# Patient Record
Sex: FEMALE | Race: WHITE | NOT HISPANIC OR LATINO | URBAN - METROPOLITAN AREA
[De-identification: names, ages, dates, MRNs, and addresses within clinical notes are randomized per-mention and may not be internally consistent; named-entity substitution may affect disease eponyms.]

---

## 2017-02-09 ENCOUNTER — OFFICE VISIT - HEALTHEAST (OUTPATIENT)
Dept: ADDICTION MEDICINE | Facility: CLINIC | Age: 52
End: 2017-02-09

## 2017-02-09 DIAGNOSIS — F10.20 SEVERE ALCOHOL USE DISORDER (H): ICD-10-CM

## 2017-03-07 ENCOUNTER — OFFICE VISIT - HEALTHEAST (OUTPATIENT)
Dept: ADDICTION MEDICINE | Facility: CLINIC | Age: 52
End: 2017-03-07

## 2017-03-07 DIAGNOSIS — F10.20 SEVERE ALCOHOL USE DISORDER (H): ICD-10-CM

## 2017-03-08 ENCOUNTER — OFFICE VISIT - HEALTHEAST (OUTPATIENT)
Dept: ADDICTION MEDICINE | Facility: CLINIC | Age: 52
End: 2017-03-08

## 2017-03-08 DIAGNOSIS — F10.20 SEVERE ALCOHOL USE DISORDER (H): ICD-10-CM

## 2017-03-10 ENCOUNTER — AMBULATORY - HEALTHEAST (OUTPATIENT)
Dept: ADDICTION MEDICINE | Facility: CLINIC | Age: 52
End: 2017-03-10

## 2017-03-10 ENCOUNTER — OFFICE VISIT - HEALTHEAST (OUTPATIENT)
Dept: ADDICTION MEDICINE | Facility: CLINIC | Age: 52
End: 2017-03-10

## 2017-03-10 DIAGNOSIS — F10.20 SEVERE ALCOHOL USE DISORDER (H): ICD-10-CM

## 2017-03-11 ENCOUNTER — AMBULATORY - HEALTHEAST (OUTPATIENT)
Dept: ADDICTION MEDICINE | Facility: CLINIC | Age: 52
End: 2017-03-11

## 2017-03-13 ENCOUNTER — OFFICE VISIT - HEALTHEAST (OUTPATIENT)
Dept: ADDICTION MEDICINE | Facility: CLINIC | Age: 52
End: 2017-03-13

## 2017-03-13 DIAGNOSIS — F10.20 SEVERE ALCOHOL USE DISORDER (H): ICD-10-CM

## 2017-03-17 ENCOUNTER — AMBULATORY - HEALTHEAST (OUTPATIENT)
Dept: ADDICTION MEDICINE | Facility: CLINIC | Age: 52
End: 2017-03-17

## 2017-03-20 ENCOUNTER — COMMUNICATION - HEALTHEAST (OUTPATIENT)
Dept: ADDICTION MEDICINE | Facility: CLINIC | Age: 52
End: 2017-03-20

## 2017-03-21 ENCOUNTER — OFFICE VISIT - HEALTHEAST (OUTPATIENT)
Dept: ADDICTION MEDICINE | Facility: CLINIC | Age: 52
End: 2017-03-21

## 2017-03-21 DIAGNOSIS — F10.20 SEVERE ALCOHOL USE DISORDER (H): ICD-10-CM

## 2017-03-22 ENCOUNTER — COMMUNICATION - HEALTHEAST (OUTPATIENT)
Dept: ADDICTION MEDICINE | Facility: CLINIC | Age: 52
End: 2017-03-22

## 2017-03-23 ENCOUNTER — AMBULATORY - HEALTHEAST (OUTPATIENT)
Dept: ADDICTION MEDICINE | Facility: CLINIC | Age: 52
End: 2017-03-23

## 2017-03-24 ENCOUNTER — OFFICE VISIT - HEALTHEAST (OUTPATIENT)
Dept: ADDICTION MEDICINE | Facility: CLINIC | Age: 52
End: 2017-03-24

## 2017-03-24 DIAGNOSIS — F10.20 SEVERE ALCOHOL USE DISORDER (H): ICD-10-CM

## 2017-03-27 ENCOUNTER — OFFICE VISIT - HEALTHEAST (OUTPATIENT)
Dept: ADDICTION MEDICINE | Facility: CLINIC | Age: 52
End: 2017-03-27

## 2017-03-27 DIAGNOSIS — F10.20 SEVERE ALCOHOL USE DISORDER (H): ICD-10-CM

## 2017-03-30 ENCOUNTER — AMBULATORY - HEALTHEAST (OUTPATIENT)
Dept: ADDICTION MEDICINE | Facility: CLINIC | Age: 52
End: 2017-03-30

## 2017-03-31 ENCOUNTER — OFFICE VISIT - HEALTHEAST (OUTPATIENT)
Dept: ADDICTION MEDICINE | Facility: CLINIC | Age: 52
End: 2017-03-31

## 2017-03-31 DIAGNOSIS — F10.20 SEVERE ALCOHOL USE DISORDER (H): ICD-10-CM

## 2017-04-03 ENCOUNTER — OFFICE VISIT - HEALTHEAST (OUTPATIENT)
Dept: ADDICTION MEDICINE | Facility: CLINIC | Age: 52
End: 2017-04-03

## 2017-04-03 DIAGNOSIS — F10.20 SEVERE ALCOHOL USE DISORDER (H): ICD-10-CM

## 2017-04-04 ENCOUNTER — OFFICE VISIT - HEALTHEAST (OUTPATIENT)
Dept: ADDICTION MEDICINE | Facility: CLINIC | Age: 52
End: 2017-04-04

## 2017-04-04 DIAGNOSIS — F10.20 SEVERE ALCOHOL USE DISORDER (H): ICD-10-CM

## 2017-04-05 ENCOUNTER — OFFICE VISIT - HEALTHEAST (OUTPATIENT)
Dept: ADDICTION MEDICINE | Facility: CLINIC | Age: 52
End: 2017-04-05

## 2017-04-05 DIAGNOSIS — F10.20 SEVERE ALCOHOL USE DISORDER (H): ICD-10-CM

## 2017-04-07 ENCOUNTER — OFFICE VISIT - HEALTHEAST (OUTPATIENT)
Dept: ADDICTION MEDICINE | Facility: CLINIC | Age: 52
End: 2017-04-07

## 2017-04-07 DIAGNOSIS — F10.20 SEVERE ALCOHOL USE DISORDER (H): ICD-10-CM

## 2017-04-09 ENCOUNTER — AMBULATORY - HEALTHEAST (OUTPATIENT)
Dept: ADDICTION MEDICINE | Facility: CLINIC | Age: 52
End: 2017-04-09

## 2017-04-12 ENCOUNTER — COMMUNICATION - HEALTHEAST (OUTPATIENT)
Dept: ADDICTION MEDICINE | Facility: CLINIC | Age: 52
End: 2017-04-12

## 2017-04-13 ENCOUNTER — AMBULATORY - HEALTHEAST (OUTPATIENT)
Dept: ADDICTION MEDICINE | Facility: CLINIC | Age: 52
End: 2017-04-13

## 2017-04-17 ENCOUNTER — OFFICE VISIT - HEALTHEAST (OUTPATIENT)
Dept: ADDICTION MEDICINE | Facility: CLINIC | Age: 52
End: 2017-04-17

## 2017-04-17 DIAGNOSIS — F10.20 SEVERE ALCOHOL USE DISORDER (H): ICD-10-CM

## 2017-04-18 ENCOUNTER — OFFICE VISIT - HEALTHEAST (OUTPATIENT)
Dept: ADDICTION MEDICINE | Facility: CLINIC | Age: 52
End: 2017-04-18

## 2017-04-18 DIAGNOSIS — F10.20 SEVERE ALCOHOL USE DISORDER (H): ICD-10-CM

## 2017-04-19 ENCOUNTER — COMMUNICATION - HEALTHEAST (OUTPATIENT)
Dept: ADDICTION MEDICINE | Facility: CLINIC | Age: 52
End: 2017-04-19

## 2017-04-19 ENCOUNTER — OFFICE VISIT - HEALTHEAST (OUTPATIENT)
Dept: ADDICTION MEDICINE | Facility: CLINIC | Age: 52
End: 2017-04-19

## 2017-04-19 DIAGNOSIS — F10.20 SEVERE ALCOHOL USE DISORDER (H): ICD-10-CM

## 2017-04-24 ENCOUNTER — OFFICE VISIT - HEALTHEAST (OUTPATIENT)
Dept: ADDICTION MEDICINE | Facility: CLINIC | Age: 52
End: 2017-04-24

## 2017-04-24 ENCOUNTER — AMBULATORY - HEALTHEAST (OUTPATIENT)
Dept: ADDICTION MEDICINE | Facility: CLINIC | Age: 52
End: 2017-04-24

## 2017-04-24 DIAGNOSIS — F10.20 SEVERE ALCOHOL USE DISORDER (H): ICD-10-CM

## 2017-04-25 ENCOUNTER — OFFICE VISIT - HEALTHEAST (OUTPATIENT)
Dept: ADDICTION MEDICINE | Facility: CLINIC | Age: 52
End: 2017-04-25

## 2017-04-25 DIAGNOSIS — F10.20 SEVERE ALCOHOL USE DISORDER (H): ICD-10-CM

## 2017-04-26 ENCOUNTER — OFFICE VISIT - HEALTHEAST (OUTPATIENT)
Dept: ADDICTION MEDICINE | Facility: CLINIC | Age: 52
End: 2017-04-26

## 2017-04-26 DIAGNOSIS — F10.20 SEVERE ALCOHOL USE DISORDER (H): ICD-10-CM

## 2017-04-28 ENCOUNTER — OFFICE VISIT - HEALTHEAST (OUTPATIENT)
Dept: ADDICTION MEDICINE | Facility: CLINIC | Age: 52
End: 2017-04-28

## 2017-04-28 DIAGNOSIS — F10.20 SEVERE ALCOHOL USE DISORDER (H): ICD-10-CM

## 2017-04-29 ENCOUNTER — AMBULATORY - HEALTHEAST (OUTPATIENT)
Dept: ADDICTION MEDICINE | Facility: CLINIC | Age: 52
End: 2017-04-29

## 2017-05-01 ENCOUNTER — OFFICE VISIT - HEALTHEAST (OUTPATIENT)
Dept: ADDICTION MEDICINE | Facility: CLINIC | Age: 52
End: 2017-05-01

## 2017-05-01 DIAGNOSIS — F10.20 SEVERE ALCOHOL USE DISORDER (H): ICD-10-CM

## 2017-05-02 ENCOUNTER — OFFICE VISIT - HEALTHEAST (OUTPATIENT)
Dept: ADDICTION MEDICINE | Facility: CLINIC | Age: 52
End: 2017-05-02

## 2017-05-02 DIAGNOSIS — F10.20 SEVERE ALCOHOL USE DISORDER (H): ICD-10-CM

## 2017-05-05 ENCOUNTER — COMMUNICATION - HEALTHEAST (OUTPATIENT)
Dept: ADDICTION MEDICINE | Facility: CLINIC | Age: 52
End: 2017-05-05

## 2017-05-09 ENCOUNTER — AMBULATORY - HEALTHEAST (OUTPATIENT)
Dept: ADDICTION MEDICINE | Facility: CLINIC | Age: 52
End: 2017-05-09

## 2017-05-12 ENCOUNTER — AMBULATORY - HEALTHEAST (OUTPATIENT)
Dept: ADDICTION MEDICINE | Facility: CLINIC | Age: 52
End: 2017-05-12

## 2017-05-15 ENCOUNTER — OFFICE VISIT - HEALTHEAST (OUTPATIENT)
Dept: ADDICTION MEDICINE | Facility: CLINIC | Age: 52
End: 2017-05-15

## 2017-05-15 DIAGNOSIS — F10.20 SEVERE ALCOHOL USE DISORDER (H): ICD-10-CM

## 2017-05-16 ENCOUNTER — OFFICE VISIT - HEALTHEAST (OUTPATIENT)
Dept: ADDICTION MEDICINE | Facility: CLINIC | Age: 52
End: 2017-05-16

## 2017-05-16 ENCOUNTER — COMMUNICATION - HEALTHEAST (OUTPATIENT)
Dept: ADDICTION MEDICINE | Facility: CLINIC | Age: 52
End: 2017-05-16

## 2017-05-16 DIAGNOSIS — F10.20 SEVERE ALCOHOL USE DISORDER (H): ICD-10-CM

## 2017-05-19 ENCOUNTER — OFFICE VISIT - HEALTHEAST (OUTPATIENT)
Dept: ADDICTION MEDICINE | Facility: CLINIC | Age: 52
End: 2017-05-19

## 2017-05-19 ENCOUNTER — AMBULATORY - HEALTHEAST (OUTPATIENT)
Dept: ADDICTION MEDICINE | Facility: CLINIC | Age: 52
End: 2017-05-19

## 2017-05-19 DIAGNOSIS — F10.20 SEVERE ALCOHOL USE DISORDER (H): ICD-10-CM

## 2017-05-23 ENCOUNTER — OFFICE VISIT - HEALTHEAST (OUTPATIENT)
Dept: ADDICTION MEDICINE | Facility: CLINIC | Age: 52
End: 2017-05-23

## 2017-05-23 DIAGNOSIS — F10.20 SEVERE ALCOHOL USE DISORDER (H): ICD-10-CM

## 2017-05-26 ENCOUNTER — OFFICE VISIT - HEALTHEAST (OUTPATIENT)
Dept: ADDICTION MEDICINE | Facility: CLINIC | Age: 52
End: 2017-05-26

## 2017-05-26 ENCOUNTER — AMBULATORY - HEALTHEAST (OUTPATIENT)
Dept: ADDICTION MEDICINE | Facility: CLINIC | Age: 52
End: 2017-05-26

## 2017-05-26 DIAGNOSIS — F10.20 SEVERE ALCOHOL USE DISORDER (H): ICD-10-CM

## 2017-06-02 ENCOUNTER — AMBULATORY - HEALTHEAST (OUTPATIENT)
Dept: ADDICTION MEDICINE | Facility: CLINIC | Age: 52
End: 2017-06-02

## 2017-06-07 ENCOUNTER — COMMUNICATION - HEALTHEAST (OUTPATIENT)
Dept: ADDICTION MEDICINE | Facility: CLINIC | Age: 52
End: 2017-06-07

## 2017-06-09 ENCOUNTER — AMBULATORY - HEALTHEAST (OUTPATIENT)
Dept: ADDICTION MEDICINE | Facility: CLINIC | Age: 52
End: 2017-06-09

## 2017-06-12 ENCOUNTER — OFFICE VISIT - HEALTHEAST (OUTPATIENT)
Dept: ADDICTION MEDICINE | Facility: CLINIC | Age: 52
End: 2017-06-12

## 2017-06-12 DIAGNOSIS — F10.20 SEVERE ALCOHOL USE DISORDER (H): ICD-10-CM

## 2017-06-15 ENCOUNTER — AMBULATORY - HEALTHEAST (OUTPATIENT)
Dept: ADDICTION MEDICINE | Facility: CLINIC | Age: 52
End: 2017-06-15

## 2017-06-16 ENCOUNTER — OFFICE VISIT - HEALTHEAST (OUTPATIENT)
Dept: ADDICTION MEDICINE | Facility: CLINIC | Age: 52
End: 2017-06-16

## 2017-06-16 DIAGNOSIS — F10.20 SEVERE ALCOHOL USE DISORDER (H): ICD-10-CM

## 2017-06-23 ENCOUNTER — AMBULATORY - HEALTHEAST (OUTPATIENT)
Dept: ADDICTION MEDICINE | Facility: CLINIC | Age: 52
End: 2017-06-23

## 2017-06-27 ENCOUNTER — OFFICE VISIT - HEALTHEAST (OUTPATIENT)
Dept: ADDICTION MEDICINE | Facility: CLINIC | Age: 52
End: 2017-06-27

## 2017-06-27 DIAGNOSIS — F10.20 SEVERE ALCOHOL USE DISORDER (H): ICD-10-CM

## 2017-06-29 ENCOUNTER — OFFICE VISIT - HEALTHEAST (OUTPATIENT)
Dept: ADDICTION MEDICINE | Facility: CLINIC | Age: 52
End: 2017-06-29

## 2017-06-29 DIAGNOSIS — F10.20 SEVERE ALCOHOL USE DISORDER (H): ICD-10-CM

## 2017-06-30 ENCOUNTER — AMBULATORY - HEALTHEAST (OUTPATIENT)
Dept: ADDICTION MEDICINE | Facility: CLINIC | Age: 52
End: 2017-06-30

## 2017-07-07 ENCOUNTER — AMBULATORY - HEALTHEAST (OUTPATIENT)
Dept: ADDICTION MEDICINE | Facility: CLINIC | Age: 52
End: 2017-07-07

## 2017-07-14 ENCOUNTER — COMMUNICATION - HEALTHEAST (OUTPATIENT)
Dept: ADDICTION MEDICINE | Facility: CLINIC | Age: 52
End: 2017-07-14

## 2017-07-14 ENCOUNTER — AMBULATORY - HEALTHEAST (OUTPATIENT)
Dept: ADDICTION MEDICINE | Facility: CLINIC | Age: 52
End: 2017-07-14

## 2017-07-22 ENCOUNTER — AMBULATORY - HEALTHEAST (OUTPATIENT)
Dept: ADDICTION MEDICINE | Facility: CLINIC | Age: 52
End: 2017-07-22

## 2017-07-26 ENCOUNTER — OFFICE VISIT - HEALTHEAST (OUTPATIENT)
Dept: ADDICTION MEDICINE | Facility: CLINIC | Age: 52
End: 2017-07-26

## 2017-07-26 DIAGNOSIS — F10.20 ALCOHOL USE DISORDER, SEVERE, DEPENDENCE (H): ICD-10-CM

## 2017-07-27 ENCOUNTER — COMMUNICATION - HEALTHEAST (OUTPATIENT)
Dept: ADDICTION MEDICINE | Facility: CLINIC | Age: 52
End: 2017-07-27

## 2017-08-09 ENCOUNTER — OFFICE VISIT - HEALTHEAST (OUTPATIENT)
Dept: ADDICTION MEDICINE | Facility: CLINIC | Age: 52
End: 2017-08-09

## 2017-08-09 DIAGNOSIS — F10.20 ALCOHOL USE DISORDER, SEVERE, DEPENDENCE (H): ICD-10-CM

## 2017-08-11 ENCOUNTER — OFFICE VISIT - HEALTHEAST (OUTPATIENT)
Dept: ADDICTION MEDICINE | Facility: CLINIC | Age: 52
End: 2017-08-11

## 2017-08-11 DIAGNOSIS — F10.20 ALCOHOL USE DISORDER, SEVERE, DEPENDENCE (H): ICD-10-CM

## 2017-08-17 ENCOUNTER — COMMUNICATION - HEALTHEAST (OUTPATIENT)
Dept: ADDICTION MEDICINE | Facility: CLINIC | Age: 52
End: 2017-08-17

## 2017-08-18 ENCOUNTER — OFFICE VISIT - HEALTHEAST (OUTPATIENT)
Dept: ADDICTION MEDICINE | Facility: CLINIC | Age: 52
End: 2017-08-18

## 2017-08-18 DIAGNOSIS — F10.20 ALCOHOL USE DISORDER, SEVERE, DEPENDENCE (H): ICD-10-CM

## 2017-08-20 ENCOUNTER — AMBULATORY - HEALTHEAST (OUTPATIENT)
Dept: ADDICTION MEDICINE | Facility: CLINIC | Age: 52
End: 2017-08-20

## 2017-08-24 ENCOUNTER — AMBULATORY - HEALTHEAST (OUTPATIENT)
Dept: ADDICTION MEDICINE | Facility: CLINIC | Age: 52
End: 2017-08-24

## 2017-08-28 ENCOUNTER — OFFICE VISIT - HEALTHEAST (OUTPATIENT)
Dept: ADDICTION MEDICINE | Facility: CLINIC | Age: 52
End: 2017-08-28

## 2017-08-28 DIAGNOSIS — F10.20 ALCOHOL USE DISORDER, SEVERE, DEPENDENCE (H): ICD-10-CM

## 2017-09-04 ENCOUNTER — AMBULATORY - HEALTHEAST (OUTPATIENT)
Dept: ADDICTION MEDICINE | Facility: CLINIC | Age: 52
End: 2017-09-04

## 2017-09-05 ENCOUNTER — COMMUNICATION - HEALTHEAST (OUTPATIENT)
Dept: ADDICTION MEDICINE | Facility: CLINIC | Age: 52
End: 2017-09-05

## 2017-09-05 ENCOUNTER — OFFICE VISIT - HEALTHEAST (OUTPATIENT)
Dept: ADDICTION MEDICINE | Facility: CLINIC | Age: 52
End: 2017-09-05

## 2017-09-05 DIAGNOSIS — F10.20 ALCOHOL USE DISORDER, SEVERE, DEPENDENCE (H): ICD-10-CM

## 2017-09-08 ENCOUNTER — OFFICE VISIT - HEALTHEAST (OUTPATIENT)
Dept: ADDICTION MEDICINE | Facility: CLINIC | Age: 52
End: 2017-09-08

## 2017-09-08 DIAGNOSIS — F10.20 ALCOHOL USE DISORDER, SEVERE, DEPENDENCE (H): ICD-10-CM

## 2017-09-11 ENCOUNTER — AMBULATORY - HEALTHEAST (OUTPATIENT)
Dept: ADDICTION MEDICINE | Facility: CLINIC | Age: 52
End: 2017-09-11

## 2017-09-12 ENCOUNTER — OFFICE VISIT - HEALTHEAST (OUTPATIENT)
Dept: ADDICTION MEDICINE | Facility: CLINIC | Age: 52
End: 2017-09-12

## 2017-09-12 DIAGNOSIS — F10.20 ALCOHOL USE DISORDER, SEVERE, DEPENDENCE (H): ICD-10-CM

## 2017-09-15 ENCOUNTER — OFFICE VISIT - HEALTHEAST (OUTPATIENT)
Dept: ADDICTION MEDICINE | Facility: CLINIC | Age: 52
End: 2017-09-15

## 2017-09-15 DIAGNOSIS — F10.20 SEVERE ALCOHOL USE DISORDER (H): ICD-10-CM

## 2017-09-17 ENCOUNTER — AMBULATORY - HEALTHEAST (OUTPATIENT)
Dept: ADDICTION MEDICINE | Facility: CLINIC | Age: 52
End: 2017-09-17

## 2017-09-18 ENCOUNTER — COMMUNICATION - HEALTHEAST (OUTPATIENT)
Dept: ADMINISTRATIVE | Facility: CLINIC | Age: 52
End: 2017-09-18

## 2017-09-19 ENCOUNTER — OFFICE VISIT - HEALTHEAST (OUTPATIENT)
Dept: FAMILY MEDICINE | Facility: CLINIC | Age: 52
End: 2017-09-19

## 2017-09-19 DIAGNOSIS — M54.2 CHRONIC NECK AND BACK PAIN: ICD-10-CM

## 2017-09-19 DIAGNOSIS — Z12.31 VISIT FOR SCREENING MAMMOGRAM: ICD-10-CM

## 2017-09-19 DIAGNOSIS — Z98.890 HISTORY OF BREAST SURGERY: ICD-10-CM

## 2017-09-19 DIAGNOSIS — G47.00 INSOMNIA: ICD-10-CM

## 2017-09-19 DIAGNOSIS — M54.41 CHRONIC RIGHT-SIDED LOW BACK PAIN WITH RIGHT-SIDED SCIATICA: ICD-10-CM

## 2017-09-19 DIAGNOSIS — Z01.818 PRE-OP EVALUATION: ICD-10-CM

## 2017-09-19 DIAGNOSIS — G89.29 CHRONIC RIGHT-SIDED LOW BACK PAIN WITH RIGHT-SIDED SCIATICA: ICD-10-CM

## 2017-09-19 DIAGNOSIS — Z12.11 SCREEN FOR COLON CANCER: ICD-10-CM

## 2017-09-19 DIAGNOSIS — L98.7 EXCESS SKIN OF ABDOMEN: ICD-10-CM

## 2017-09-19 DIAGNOSIS — M54.9 CHRONIC NECK AND BACK PAIN: ICD-10-CM

## 2017-09-19 DIAGNOSIS — G89.29 CHRONIC NECK AND BACK PAIN: ICD-10-CM

## 2017-09-19 DIAGNOSIS — B00.9 HSV-1 (HERPES SIMPLEX VIRUS 1) INFECTION: ICD-10-CM

## 2017-09-19 DIAGNOSIS — D51.0 PERNICIOUS ANEMIA: ICD-10-CM

## 2017-09-19 RX ORDER — MAGNESIUM 200 MG
TABLET ORAL DAILY
Status: SHIPPED | COMMUNITY
Start: 2017-09-19

## 2017-09-19 ASSESSMENT — MIFFLIN-ST. JEOR: SCORE: 1228.64

## 2017-09-20 ENCOUNTER — OFFICE VISIT - HEALTHEAST (OUTPATIENT)
Dept: ADDICTION MEDICINE | Facility: CLINIC | Age: 52
End: 2017-09-20

## 2017-09-20 ENCOUNTER — COMMUNICATION - HEALTHEAST (OUTPATIENT)
Dept: FAMILY MEDICINE | Facility: CLINIC | Age: 52
End: 2017-09-20

## 2017-09-20 DIAGNOSIS — Z01.818 PRE-OP EVALUATION: ICD-10-CM

## 2017-09-20 DIAGNOSIS — F10.20 ALCOHOL USE DISORDER, SEVERE, DEPENDENCE (H): ICD-10-CM

## 2017-09-21 ENCOUNTER — AMBULATORY - HEALTHEAST (OUTPATIENT)
Dept: LAB | Facility: CLINIC | Age: 52
End: 2017-09-21

## 2017-09-21 DIAGNOSIS — Z01.818 PRE-OP EVALUATION: ICD-10-CM

## 2017-09-22 ENCOUNTER — OFFICE VISIT - HEALTHEAST (OUTPATIENT)
Dept: ADDICTION MEDICINE | Facility: CLINIC | Age: 52
End: 2017-09-22

## 2017-09-22 DIAGNOSIS — F10.20 ALCOHOL USE DISORDER, SEVERE, DEPENDENCE (H): ICD-10-CM

## 2017-09-23 ENCOUNTER — AMBULATORY - HEALTHEAST (OUTPATIENT)
Dept: ADDICTION MEDICINE | Facility: CLINIC | Age: 52
End: 2017-09-23

## 2017-09-26 ENCOUNTER — OFFICE VISIT - HEALTHEAST (OUTPATIENT)
Dept: ADDICTION MEDICINE | Facility: CLINIC | Age: 52
End: 2017-09-26

## 2017-09-26 DIAGNOSIS — F10.20 ALCOHOL USE DISORDER, SEVERE, DEPENDENCE (H): ICD-10-CM

## 2017-09-29 ENCOUNTER — AMBULATORY - HEALTHEAST (OUTPATIENT)
Dept: ADDICTION MEDICINE | Facility: CLINIC | Age: 52
End: 2017-09-29

## 2017-10-06 ENCOUNTER — AMBULATORY - HEALTHEAST (OUTPATIENT)
Dept: ADDICTION MEDICINE | Facility: CLINIC | Age: 52
End: 2017-10-06

## 2017-10-06 ENCOUNTER — COMMUNICATION - HEALTHEAST (OUTPATIENT)
Dept: ADDICTION MEDICINE | Facility: CLINIC | Age: 52
End: 2017-10-06

## 2017-10-11 ENCOUNTER — OFFICE VISIT - HEALTHEAST (OUTPATIENT)
Dept: ADDICTION MEDICINE | Facility: CLINIC | Age: 52
End: 2017-10-11

## 2017-10-11 DIAGNOSIS — F10.20 ALCOHOL USE DISORDER, SEVERE, DEPENDENCE (H): ICD-10-CM

## 2017-10-13 ENCOUNTER — OFFICE VISIT - HEALTHEAST (OUTPATIENT)
Dept: ADDICTION MEDICINE | Facility: CLINIC | Age: 52
End: 2017-10-13

## 2017-10-13 DIAGNOSIS — F10.20 ALCOHOL USE DISORDER, SEVERE, DEPENDENCE (H): ICD-10-CM

## 2017-10-14 ENCOUNTER — AMBULATORY - HEALTHEAST (OUTPATIENT)
Dept: ADDICTION MEDICINE | Facility: CLINIC | Age: 52
End: 2017-10-14

## 2017-10-16 ENCOUNTER — OFFICE VISIT - HEALTHEAST (OUTPATIENT)
Dept: ADDICTION MEDICINE | Facility: CLINIC | Age: 52
End: 2017-10-16

## 2017-10-16 DIAGNOSIS — F10.20 ALCOHOL USE DISORDER, SEVERE, DEPENDENCE (H): ICD-10-CM

## 2017-10-21 ENCOUNTER — AMBULATORY - HEALTHEAST (OUTPATIENT)
Dept: ADDICTION MEDICINE | Facility: CLINIC | Age: 52
End: 2017-10-21

## 2017-10-26 ENCOUNTER — AMBULATORY - HEALTHEAST (OUTPATIENT)
Dept: ADDICTION MEDICINE | Facility: CLINIC | Age: 52
End: 2017-10-26

## 2017-11-01 ENCOUNTER — OFFICE VISIT - HEALTHEAST (OUTPATIENT)
Dept: ADDICTION MEDICINE | Facility: CLINIC | Age: 52
End: 2017-11-01

## 2017-11-01 DIAGNOSIS — F10.20 ALCOHOL USE DISORDER, SEVERE, DEPENDENCE (H): ICD-10-CM

## 2017-11-03 ENCOUNTER — AMBULATORY - HEALTHEAST (OUTPATIENT)
Dept: ADDICTION MEDICINE | Facility: CLINIC | Age: 52
End: 2017-11-03

## 2017-11-08 ENCOUNTER — COMMUNICATION - HEALTHEAST (OUTPATIENT)
Dept: BEHAVIORAL HEALTH | Facility: CLINIC | Age: 52
End: 2017-11-08

## 2017-11-08 ENCOUNTER — COMMUNICATION - HEALTHEAST (OUTPATIENT)
Dept: FAMILY MEDICINE | Facility: CLINIC | Age: 52
End: 2017-11-08

## 2017-11-08 DIAGNOSIS — M54.2 CHRONIC NECK AND BACK PAIN: ICD-10-CM

## 2017-11-08 DIAGNOSIS — M54.9 CHRONIC NECK AND BACK PAIN: ICD-10-CM

## 2017-11-08 DIAGNOSIS — G89.29 CHRONIC RIGHT-SIDED LOW BACK PAIN WITH RIGHT-SIDED SCIATICA: ICD-10-CM

## 2017-11-08 DIAGNOSIS — G47.00 INSOMNIA: ICD-10-CM

## 2017-11-08 DIAGNOSIS — F10.20 ALCOHOL USE DISORDER, SEVERE, DEPENDENCE (H): ICD-10-CM

## 2017-11-08 DIAGNOSIS — M54.41 CHRONIC RIGHT-SIDED LOW BACK PAIN WITH RIGHT-SIDED SCIATICA: ICD-10-CM

## 2017-11-08 DIAGNOSIS — G89.29 CHRONIC NECK AND BACK PAIN: ICD-10-CM

## 2017-11-10 ENCOUNTER — AMBULATORY - HEALTHEAST (OUTPATIENT)
Dept: ADDICTION MEDICINE | Facility: CLINIC | Age: 52
End: 2017-11-10

## 2017-11-10 ENCOUNTER — OFFICE VISIT - HEALTHEAST (OUTPATIENT)
Dept: ADDICTION MEDICINE | Facility: CLINIC | Age: 52
End: 2017-11-10

## 2017-11-10 DIAGNOSIS — F10.20 ALCOHOL USE DISORDER, SEVERE, DEPENDENCE (H): ICD-10-CM

## 2017-11-14 ENCOUNTER — OFFICE VISIT - HEALTHEAST (OUTPATIENT)
Dept: ADDICTION MEDICINE | Facility: CLINIC | Age: 52
End: 2017-11-14

## 2017-11-14 DIAGNOSIS — F10.20 ALCOHOL USE DISORDER, SEVERE, DEPENDENCE (H): ICD-10-CM

## 2017-11-15 ENCOUNTER — OFFICE VISIT - HEALTHEAST (OUTPATIENT)
Dept: ADDICTION MEDICINE | Facility: CLINIC | Age: 52
End: 2017-11-15

## 2017-11-15 DIAGNOSIS — F10.20 ALCOHOL USE DISORDER, SEVERE, DEPENDENCE (H): ICD-10-CM

## 2017-11-17 ENCOUNTER — AMBULATORY - HEALTHEAST (OUTPATIENT)
Dept: ADDICTION MEDICINE | Facility: CLINIC | Age: 52
End: 2017-11-17

## 2017-11-21 ENCOUNTER — AMBULATORY - HEALTHEAST (OUTPATIENT)
Dept: ADDICTION MEDICINE | Facility: CLINIC | Age: 52
End: 2017-11-21

## 2017-11-21 ENCOUNTER — OFFICE VISIT - HEALTHEAST (OUTPATIENT)
Dept: ADDICTION MEDICINE | Facility: CLINIC | Age: 52
End: 2017-11-21

## 2017-11-21 DIAGNOSIS — F10.20 ALCOHOL USE DISORDER, SEVERE, DEPENDENCE (H): ICD-10-CM

## 2017-11-25 ENCOUNTER — AMBULATORY - HEALTHEAST (OUTPATIENT)
Dept: ADDICTION MEDICINE | Facility: CLINIC | Age: 52
End: 2017-11-25

## 2017-11-27 ENCOUNTER — OFFICE VISIT - HEALTHEAST (OUTPATIENT)
Dept: ADDICTION MEDICINE | Facility: CLINIC | Age: 52
End: 2017-11-27

## 2017-11-27 DIAGNOSIS — F10.20 ALCOHOL USE DISORDER, SEVERE, DEPENDENCE (H): ICD-10-CM

## 2017-11-28 ENCOUNTER — OFFICE VISIT - HEALTHEAST (OUTPATIENT)
Dept: ADDICTION MEDICINE | Facility: CLINIC | Age: 52
End: 2017-11-28

## 2017-11-28 DIAGNOSIS — F10.20 ALCOHOL USE DISORDER, SEVERE, DEPENDENCE (H): ICD-10-CM

## 2017-11-29 ENCOUNTER — AMBULATORY - HEALTHEAST (OUTPATIENT)
Dept: ADDICTION MEDICINE | Facility: CLINIC | Age: 52
End: 2017-11-29

## 2017-12-05 ENCOUNTER — COMMUNICATION - HEALTHEAST (OUTPATIENT)
Dept: ADDICTION MEDICINE | Facility: CLINIC | Age: 52
End: 2017-12-05

## 2017-12-29 ENCOUNTER — COMMUNICATION - HEALTHEAST (OUTPATIENT)
Dept: FAMILY MEDICINE | Facility: CLINIC | Age: 52
End: 2017-12-29

## 2017-12-29 DIAGNOSIS — G89.29 CHRONIC RIGHT-SIDED LOW BACK PAIN WITH RIGHT-SIDED SCIATICA: ICD-10-CM

## 2017-12-29 DIAGNOSIS — M54.9 CHRONIC NECK AND BACK PAIN: ICD-10-CM

## 2017-12-29 DIAGNOSIS — M54.2 CHRONIC NECK AND BACK PAIN: ICD-10-CM

## 2017-12-29 DIAGNOSIS — G47.00 INSOMNIA: ICD-10-CM

## 2017-12-29 DIAGNOSIS — G89.29 CHRONIC NECK AND BACK PAIN: ICD-10-CM

## 2017-12-29 DIAGNOSIS — M54.41 CHRONIC RIGHT-SIDED LOW BACK PAIN WITH RIGHT-SIDED SCIATICA: ICD-10-CM

## 2018-02-09 ENCOUNTER — COMMUNICATION - HEALTHEAST (OUTPATIENT)
Dept: FAMILY MEDICINE | Facility: CLINIC | Age: 53
End: 2018-02-09

## 2018-02-09 DIAGNOSIS — G89.29 CHRONIC RIGHT-SIDED LOW BACK PAIN WITH RIGHT-SIDED SCIATICA: ICD-10-CM

## 2018-02-09 DIAGNOSIS — M54.9 CHRONIC NECK AND BACK PAIN: ICD-10-CM

## 2018-02-09 DIAGNOSIS — G47.00 INSOMNIA: ICD-10-CM

## 2018-02-09 DIAGNOSIS — M54.2 CHRONIC NECK AND BACK PAIN: ICD-10-CM

## 2018-02-09 DIAGNOSIS — M54.41 CHRONIC RIGHT-SIDED LOW BACK PAIN WITH RIGHT-SIDED SCIATICA: ICD-10-CM

## 2018-02-09 DIAGNOSIS — G89.29 CHRONIC NECK AND BACK PAIN: ICD-10-CM

## 2018-03-07 ENCOUNTER — COMMUNICATION - HEALTHEAST (OUTPATIENT)
Dept: FAMILY MEDICINE | Facility: CLINIC | Age: 53
End: 2018-03-07

## 2018-03-07 DIAGNOSIS — G47.00 INSOMNIA: ICD-10-CM

## 2018-03-07 DIAGNOSIS — G89.29 CHRONIC RIGHT-SIDED LOW BACK PAIN WITH RIGHT-SIDED SCIATICA: ICD-10-CM

## 2018-03-07 DIAGNOSIS — M54.41 CHRONIC RIGHT-SIDED LOW BACK PAIN WITH RIGHT-SIDED SCIATICA: ICD-10-CM

## 2018-03-07 DIAGNOSIS — M54.2 CHRONIC NECK AND BACK PAIN: ICD-10-CM

## 2018-03-07 DIAGNOSIS — G89.29 CHRONIC NECK AND BACK PAIN: ICD-10-CM

## 2018-03-07 DIAGNOSIS — M54.9 CHRONIC NECK AND BACK PAIN: ICD-10-CM

## 2018-03-29 ENCOUNTER — OFFICE VISIT - HEALTHEAST (OUTPATIENT)
Dept: FAMILY MEDICINE | Facility: CLINIC | Age: 53
End: 2018-03-29

## 2018-03-29 ENCOUNTER — COMMUNICATION - HEALTHEAST (OUTPATIENT)
Dept: FAMILY MEDICINE | Facility: CLINIC | Age: 53
End: 2018-03-29

## 2018-03-29 DIAGNOSIS — B00.9 HSV-1 (HERPES SIMPLEX VIRUS 1) INFECTION: ICD-10-CM

## 2018-03-29 DIAGNOSIS — D51.0 PERNICIOUS ANEMIA: ICD-10-CM

## 2018-03-29 DIAGNOSIS — E03.9 HYPOTHYROID: ICD-10-CM

## 2018-03-29 DIAGNOSIS — M54.9 CHRONIC NECK AND BACK PAIN: ICD-10-CM

## 2018-03-29 DIAGNOSIS — Z13.1 SCREENING FOR DIABETES MELLITUS: ICD-10-CM

## 2018-03-29 DIAGNOSIS — F33.1 MODERATE EPISODE OF RECURRENT MAJOR DEPRESSIVE DISORDER (H): ICD-10-CM

## 2018-03-29 DIAGNOSIS — Z12.31 VISIT FOR SCREENING MAMMOGRAM: ICD-10-CM

## 2018-03-29 DIAGNOSIS — Z13.220 SCREENING FOR LIPID DISORDERS: ICD-10-CM

## 2018-03-29 DIAGNOSIS — M54.41 CHRONIC RIGHT-SIDED LOW BACK PAIN WITH RIGHT-SIDED SCIATICA: ICD-10-CM

## 2018-03-29 DIAGNOSIS — G89.29 CHRONIC RIGHT-SIDED LOW BACK PAIN WITH RIGHT-SIDED SCIATICA: ICD-10-CM

## 2018-03-29 DIAGNOSIS — G47.00 INSOMNIA: ICD-10-CM

## 2018-03-29 DIAGNOSIS — F10.20 ALCOHOL USE DISORDER, SEVERE, DEPENDENCE (H): ICD-10-CM

## 2018-03-29 DIAGNOSIS — M54.2 CHRONIC NECK AND BACK PAIN: ICD-10-CM

## 2018-03-29 DIAGNOSIS — G89.29 CHRONIC NECK AND BACK PAIN: ICD-10-CM

## 2018-03-29 LAB
ANION GAP SERPL CALCULATED.3IONS-SCNC: 14 MMOL/L (ref 5–18)
BUN SERPL-MCNC: 14 MG/DL (ref 8–22)
CALCIUM SERPL-MCNC: 9.2 MG/DL (ref 8.5–10.5)
CHLORIDE BLD-SCNC: 105 MMOL/L (ref 98–107)
CHOLEST SERPL-MCNC: 218 MG/DL
CO2 SERPL-SCNC: 19 MMOL/L (ref 22–31)
CREAT SERPL-MCNC: 0.73 MG/DL (ref 0.6–1.1)
ERYTHROCYTE [DISTWIDTH] IN BLOOD BY AUTOMATED COUNT: 18 % (ref 11–14.5)
FASTING STATUS PATIENT QL REPORTED: YES
GFR SERPL CREATININE-BSD FRML MDRD: >60 ML/MIN/1.73M2
GLUCOSE BLD-MCNC: 78 MG/DL (ref 70–125)
HCT VFR BLD AUTO: 37.1 % (ref 35–47)
HDLC SERPL-MCNC: 139 MG/DL
HGB BLD-MCNC: 11.8 G/DL (ref 12–16)
LDLC SERPL CALC-MCNC: 67 MG/DL
MCH RBC QN AUTO: 27.9 PG (ref 27–34)
MCHC RBC AUTO-ENTMCNC: 31.7 G/DL (ref 32–36)
MCV RBC AUTO: 88 FL (ref 80–100)
PLATELET # BLD AUTO: 281 THOU/UL (ref 140–440)
PMV BLD AUTO: 6.9 FL (ref 7–10)
POTASSIUM BLD-SCNC: 4.2 MMOL/L (ref 3.5–5)
RBC # BLD AUTO: 4.23 MILL/UL (ref 3.8–5.4)
SODIUM SERPL-SCNC: 138 MMOL/L (ref 136–145)
TRIGL SERPL-MCNC: 62 MG/DL
WBC: 3.3 THOU/UL (ref 4–11)

## 2018-03-29 RX ORDER — SUMATRIPTAN 50 MG/1
50 TABLET, FILM COATED ORAL
Qty: 20 TABLET | Refills: 0 | Status: SHIPPED | OUTPATIENT
Start: 2018-03-29

## 2018-03-29 RX ORDER — SYRINGE-NEEDLE,INSULIN,0.5 ML 27GX1/2"
SYRINGE, EMPTY DISPOSABLE MISCELLANEOUS
Qty: 6 EACH | Refills: 0 | Status: SHIPPED | OUTPATIENT
Start: 2018-03-29

## 2018-03-29 RX ORDER — ESCITALOPRAM OXALATE 20 MG/1
20 TABLET ORAL DAILY
Qty: 90 TABLET | Refills: 3 | Status: SHIPPED | OUTPATIENT
Start: 2018-03-29

## 2018-03-29 RX ORDER — HYDROXYZINE HYDROCHLORIDE 25 MG/1
25-50 TABLET, FILM COATED ORAL AT BEDTIME
Qty: 90 TABLET | Refills: 0 | Status: SHIPPED | OUTPATIENT
Start: 2018-03-29

## 2018-03-29 RX ORDER — LEVOTHYROXINE SODIUM 50 UG/1
50 TABLET ORAL DAILY
Qty: 90 TABLET | Refills: 0 | Status: SHIPPED | OUTPATIENT
Start: 2018-03-29

## 2018-03-29 ASSESSMENT — MIFFLIN-ST. JEOR: SCORE: 1203.24

## 2018-03-30 ENCOUNTER — COMMUNICATION - HEALTHEAST (OUTPATIENT)
Dept: FAMILY MEDICINE | Facility: CLINIC | Age: 53
End: 2018-03-30

## 2018-05-30 ENCOUNTER — RECORDS - HEALTHEAST (OUTPATIENT)
Dept: ADMINISTRATIVE | Facility: OTHER | Age: 53
End: 2018-05-30

## 2018-10-01 ENCOUNTER — COMMUNICATION - HEALTHEAST (OUTPATIENT)
Dept: FAMILY MEDICINE | Facility: CLINIC | Age: 53
End: 2018-10-01

## 2018-10-01 DIAGNOSIS — G47.00 INSOMNIA: ICD-10-CM

## 2018-10-05 ENCOUNTER — OFFICE VISIT - HEALTHEAST (OUTPATIENT)
Dept: FAMILY MEDICINE | Facility: CLINIC | Age: 53
End: 2018-10-05

## 2018-10-05 DIAGNOSIS — D51.0 PERNICIOUS ANEMIA: ICD-10-CM

## 2018-10-05 DIAGNOSIS — G47.00 INSOMNIA, UNSPECIFIED TYPE: ICD-10-CM

## 2018-10-05 RX ORDER — SYRINGE W-NEEDLE,DISPOSAB,3 ML 25GX5/8"
SYRINGE, EMPTY DISPOSABLE MISCELLANEOUS
Qty: 3 SYRINGE | Refills: 3 | Status: SHIPPED | OUTPATIENT
Start: 2018-10-05

## 2018-10-05 ASSESSMENT — MIFFLIN-ST. JEOR: SCORE: 1251.15

## 2018-10-19 ENCOUNTER — RECORDS - HEALTHEAST (OUTPATIENT)
Dept: ADMINISTRATIVE | Facility: OTHER | Age: 53
End: 2018-10-19

## 2019-01-14 ENCOUNTER — COMMUNICATION - HEALTHEAST (OUTPATIENT)
Dept: FAMILY MEDICINE | Facility: CLINIC | Age: 54
End: 2019-01-14

## 2019-03-12 ENCOUNTER — OFFICE VISIT - HEALTHEAST (OUTPATIENT)
Dept: FAMILY MEDICINE | Facility: CLINIC | Age: 54
End: 2019-03-12

## 2019-03-12 DIAGNOSIS — Z79.899 MEDICATION MANAGEMENT: ICD-10-CM

## 2019-03-12 DIAGNOSIS — G47.00 INSOMNIA, UNSPECIFIED TYPE: ICD-10-CM

## 2019-03-12 DIAGNOSIS — E03.9 HYPOTHYROIDISM, UNSPECIFIED TYPE: ICD-10-CM

## 2019-03-12 DIAGNOSIS — M54.41 CHRONIC RIGHT-SIDED LOW BACK PAIN WITH RIGHT-SIDED SCIATICA: ICD-10-CM

## 2019-03-12 DIAGNOSIS — M54.2 CHRONIC NECK AND BACK PAIN: ICD-10-CM

## 2019-03-12 DIAGNOSIS — F33.1 MODERATE EPISODE OF RECURRENT MAJOR DEPRESSIVE DISORDER (H): ICD-10-CM

## 2019-03-12 DIAGNOSIS — B00.9 HSV-1 (HERPES SIMPLEX VIRUS 1) INFECTION: ICD-10-CM

## 2019-03-12 DIAGNOSIS — M54.9 CHRONIC NECK AND BACK PAIN: ICD-10-CM

## 2019-03-12 DIAGNOSIS — Z87.898 HISTORY OF ALCOHOL USE DISORDER: ICD-10-CM

## 2019-03-12 DIAGNOSIS — G89.29 CHRONIC NECK AND BACK PAIN: ICD-10-CM

## 2019-03-12 DIAGNOSIS — G89.29 CHRONIC RIGHT-SIDED LOW BACK PAIN WITH RIGHT-SIDED SCIATICA: ICD-10-CM

## 2019-03-12 DIAGNOSIS — D51.0 PERNICIOUS ANEMIA: ICD-10-CM

## 2019-03-12 RX ORDER — TRAZODONE HYDROCHLORIDE 50 MG/1
50 TABLET, FILM COATED ORAL AT BEDTIME
Qty: 60 TABLET | Refills: 0 | Status: SHIPPED | OUTPATIENT
Start: 2019-03-12

## 2019-03-12 ASSESSMENT — MIFFLIN-ST. JEOR: SCORE: 1239.53

## 2020-01-14 ENCOUNTER — COMMUNICATION - HEALTHEAST (OUTPATIENT)
Dept: FAMILY MEDICINE | Facility: CLINIC | Age: 55
End: 2020-01-14

## 2020-01-14 DIAGNOSIS — M54.2 CHRONIC NECK AND BACK PAIN: ICD-10-CM

## 2020-01-14 DIAGNOSIS — M54.9 CHRONIC NECK AND BACK PAIN: ICD-10-CM

## 2020-01-14 DIAGNOSIS — M54.41 CHRONIC RIGHT-SIDED LOW BACK PAIN WITH RIGHT-SIDED SCIATICA: ICD-10-CM

## 2020-01-14 DIAGNOSIS — G89.29 CHRONIC RIGHT-SIDED LOW BACK PAIN WITH RIGHT-SIDED SCIATICA: ICD-10-CM

## 2020-01-14 DIAGNOSIS — G89.29 CHRONIC NECK AND BACK PAIN: ICD-10-CM

## 2020-01-16 RX ORDER — TIZANIDINE 2 MG/1
TABLET ORAL
Qty: 30 TABLET | Refills: 0 | Status: SHIPPED | OUTPATIENT
Start: 2020-01-16

## 2021-05-31 VITALS — BODY MASS INDEX: 26.15 KG/M2 | WEIGHT: 147.6 LBS | HEIGHT: 63 IN

## 2021-06-01 VITALS — BODY MASS INDEX: 25.16 KG/M2 | HEIGHT: 63 IN | WEIGHT: 142 LBS

## 2021-06-02 VITALS — BODY MASS INDEX: 27.03 KG/M2 | HEIGHT: 63 IN | WEIGHT: 152.56 LBS

## 2021-06-02 VITALS — WEIGHT: 150 LBS | BODY MASS INDEX: 26.58 KG/M2 | HEIGHT: 63 IN

## 2021-06-05 NOTE — TELEPHONE ENCOUNTER
Patient states she is out of medication requesting for refill . Patient requested a call from clinic up on completion of Rx   Refill Request  Did you contact pharmacy: No  Medication name:   Requested Prescriptions     Pending Prescriptions Disp Refills     tiZANidine (ZANAFLEX) 2 MG tablet 30 tablet 0     Sig: TAKE 1 TABLET BY MOUTH EVERY SIX HOURS IF NEEDED   Who prescribed the medication: Sheryl Watt CNP  Requested Pharmacy: [facility] # [id number]  Is patient out of medication: Yes  Patient notified refills processed in 3 business days:  yes  Okay to leave a detailed message: no

## 2021-06-05 NOTE — TELEPHONE ENCOUNTER
Per my last note patient was going to be establishing care with a new provider in [place]. I would recommend that this provider take over prescribing this medication. Sheryl Watt, CNP

## 2021-06-05 NOTE — TELEPHONE ENCOUNTER
RN cannot approve Refill Request    RN can NOT refill this medication med is not covered by policy/route to provider. Last office visit: 3/12/2019 Sheryl Watt CNP Last Physical: Visit date not found Last MTM visit: Visit date not found Last visit same specialty: 3/12/2019 Sheryl Watt CNP.  Next visit within 3 mo: Visit date not found  Next physical within 3 mo: Visit date not found      Anabella Oliveira, [facility] Triage/Med Refill 1/15/2020    Requested Prescriptions   Pending Prescriptions Disp Refills     tiZANidine (ZANAFLEX) 2 MG tablet 30 tablet 0     Sig: TAKE 1 TABLET BY MOUTH EVERY SIX HOURS IF NEEDED       There is no refill protocol information for this order

## 2021-06-05 NOTE — TELEPHONE ENCOUNTER
Spoke with pt and she said she still comes this way to [place] and is going to continue care with [facility] so I asked her if she had time to set up a Px appt and she said she is going to [place] for a  and when she gets back she will call and schedule a px with [facility] and can [facility] approve 1 month of her Rx's until then. I told her I would pass along her request.

## 2021-06-08 NOTE — PROGRESS NOTES
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 (304.60)   []  Opioid Use Disorder [] Mild  [] Moderate  [] Severe [] (F11.10) (305.50)  [] (F11.20) (304.00)  [] (F11.20) (304.00)   []  Sedative, Hypnotic, or Anxiolytic Use Disorder [] Mild  [] Moderate  [] Severe [] (F13.10) (305.40)   [] (F13.20) (304.10)  [] (F13.20) (304.10)   []  Stimulant Related Disorders [] Mild  [] Moderate  [] Severe [] (F15.10) (305.70) Amphetamine type substance  [] (F14.10) (305.60) Cocaine  [] (F15.10) (305.70) Other or unspecified stimulant  [] (F15.20) (304.40) Amphetamine type substance  [] (F14.20) (304.20) Cocaine  [] (F15.20) (304.40) Other or unspecified stimulant  [] (F15.20) (304.40) Amphetamine type substance  [] (F14.20) (304.20) Cocaine  [] (F15.20) (304.40) Other or unspecified stimulant   []  Tobacco use Disorder [] Mild  [] Moderate  [] Severe [] (Z72.0) (305.1)  [] (F17.200) (305.1)  [] (F17.200) (305.1)   []  Other (or unknown) Substance Use Disorder [] Mild  [] Moderate  [] Severe [] (F19.10) (305.90)  [] (F19.20) (304.90)  [] (F19.20) (304.90)       Suggested Level of Care Necessary for Recovery  [x]  Inpatient   []  Extended Care []  Residential []  Outpatient  []  None            Collateral Contact Summary   Number of contacts made:  1  Contact with referring person: client     If court related records were reviewed, summarize here:  NA     []   Information from collateral contacts supported/largely agreed with information from the client and associated risk ratings.   [x]   Information from collateral contacts was significantly different from information from the client and lead to different risk ratings.      Summarize new information here: Collateral states that the client has attempted suicide several times with pills. [facility] attempted to civil commit a number of times and every time she would agree to treatment only to go out and drink again.client is addicted to alcohol, shopping, eating, compulsive lying. collateral is fearful that she will succeed at SA. Collateral also noted that the mental health symptoms have been present for most of the clients life since teenage years . Sister reports that the client is on High SA listed in her chart. [facility] refusing to let her readmit, Just left partial day program.       Rule 25 Assessment Summary and Plan   's Recommendation    Recommend Client enter inpatient treatment at  [facility]  For MI CD and medical detox from alcohol  Client was a High Risk on the PANSI Screen  Recommend that a DA  Be completed and referral for longer care treatment.             Collateral Contacts     Please duplicate this page for each contact.  If this includes information which is sensitive and not public, separate this page from the rest of the assessment before sharing.  Retain the page in the assessment file.   Name    Nancy West  Relationship    Daughter Phone Number    [phone] Releases    yes       Information Provided:      Client backed up client histroy    Collateral Contacts     Name    Jenifer West   Relationship    sister Phone Number    [phone] Releases    yes        Information Provided:  Collateral had expanded explanation, very concerned at the amount of time her sister has spent in rehab the last few years and reports multiple SA with in that time      Staff Name and Title:  Sharon Gonzales, ADC-T      Date:  2/9/2017  Time:  10:40 AM

## 2021-06-09 NOTE — PROGRESS NOTES
NEISHA Writer met with patient 1:1. A. For the purpose of treatment planning and general check in. Writer discussed with patient what goals she might want to work on while in treatment, as well as any concerns she may have so far in the group. R. Patient discussed that grief is a significant issue, particularly with her last relationship. Patient also reported having negative feeling towards herself related to her drinking and the end of her last marriage. T. Writer to follow up with treatment plan.     GONZALO Cloud  3/13/2017, 2:09 PM

## 2021-06-09 NOTE — PROGRESS NOTES
Weekly Progress Note  Buffy Chasee  1965  [id number]      D) Pt attended 2 groups  this week with 0 absences. A) Staff facilitated groups and reviewed tx progress.  Counselor will meet individually with client next week to develop treatment plan.   Assessed for [facility]. R) No VAP needed at this time. Pt working on the following dimensions:  Dimension #1 - Withdrawal Potential - Risk 0, No concerns. Ct denies use and her WD symptom were resolved while in treatment.    Dimension #2 - Biomedical - Risk 1, mild concern. Patient reports medical concerns including chronic neck and back pain, carpal tunnel, hypothyroidism, anemia, and GERD which she reports is from having gastric bypass surgery in 2011. Patient reports that she does receive primary care through the [facility] in [facility].     Dimension #3 - Emotional/Behavioral/Cognitive -Risk 2, moderate concern. Patient reports mental health diagnoses of PTSD and depression. Patient appears to struggle with her mental health as evidence by her recent stay on a mental health unit as well as recent suicide attempt, multiple mental health hospital admits. Patient reports [facility], but also expresses interest in receiving mental health services at [facility]. Patient reports some passive SI, but no intent or plan, commits to safety.     Dimension #4 - Treatment Acceptance/Resistance - Risk 0, No concerns. Ct is self motivated and ready to engage in treatment at this time.  Ct declined to talk much during group the first day, but felt more at ease later in the week.    Dimension #5 - Relapse Potential - reports current abstinence since inpatient treatment. Patient reports history of treatment services in the past. Patient reports some understanding of relapse prevention skills, but reports needing more support.     Dimension #6 - Recovery Environment -Recovery Environment - Risk 2, moderate concern. Patient reports  she has recently moved into a sober house. Patient reports limited daily activity. Patient reports her primary support are from her adult children and sister. Patient reports DWI in 2016, must complete treatment in order to get her license reinstated.    T) Patient educated on Coping with difficult Feelings. Patient has completed 6 of 90hours of program at this time. Projected discharge date is June 8th, 2017. Current discharge plan is TBD.       Jennifer Bradshaw MA, GONZALO  3/11/2017, 3:37 PM      D) Ct was staffed this date.  Present were Jennifer Bradshaw MA, GONZALO Haynes, [facility], Viky Newton MA, PHILADCx    Jennifer Bradshaw MA, GONZALO    Weekly Educational Topics Date Education   1. Understanding Dual Diagnoses, week 1         Understanding Dual Diagnoses, week 2         2. Coping with Stress         3. Managing Difficult Feelings 3/8/,10        4. Building Recovery Support         5. Managing Thinking Problems         6. Developing Assertive Communication Skills         7. The Art of Change        8. Relapse Prevention        9. Relationships/Boundaries       10. Grief and Loss        11. Strengths         12. Dual Recovery Planning         Seeking Safety Unit every Tuesday         HIV Education         Mindfulness/Meditation every Friday 3/10      OT/RT every Wednesday for one hour

## 2021-06-09 NOTE — PROGRESS NOTES
VARGAS. Writer and patient met briefly following group, at patient's request. A. Writer inquired as to how the patient is feeling and her progress in treatment so far. R. Patient reported that she is needing documentation sent to her  regarding current treatment. Patient also discussed that she is feeling positive about her recovery and feels that she is getting a new start. T. Writer to follow up regarding WAYLON for patient's .     GONZALO Cloud  4/4/2017, 4:17 PM

## 2021-06-09 NOTE — PROGRESS NOTES
Weekly Progress Note  Alicia Chase  1965  [id number]      D) Pt attended 2 groups  this week with 2 excused absences, patient reported appointments with [facility]. A) Staff facilitated groups and reviewed tx progress.  Patient's treatment plan is created but not signed at this time.   Assessed for [facility]. R) No VAP needed at this time. Pt working on the following dimensions:  Dimension #1 - Withdrawal Potential - Risk 0, No concerns. Patient reports continued abstinence since 3/17/17, and discussed that she is not currently having any urges (Method 1).     Dimension #2 - Biomedical - Risk 1, mild concern. Patient reported no changes to physical health this past week, but reported having visits to [facility] during the week.  (Method 1).     Dimension #3 - Emotional/Behavioral/Cognitive -Risk 2, moderate concern. Patient reported some difficulty coping with current stressors, but is trying to use coping skills. Patient reports [facility] to the [facility] this week, which may be related to mental health (Method 1).      Dimension #4 - Treatment Acceptance/Resistance - Risk 0, no concerns. Patient reports desire to be in group, but difficulty with transportation and housing. Patient and staff to continue to communicate regarding treatment participation (Method 1).     Dimension #5 - Relapse Potential - Risk 3, serious concerns.  Patient reports continued abstinence and reports stressors cause difficulty with maintaining recovery (Method 1).   Dimension #6 - Recovery Environment -Risk 3, serious concern. Patient reports going to meetings over the weekend and is working towards enhancing her recovery support (Method 1). Patient reports she had went to a sober house last week, but reports the accomodations were not appropriate. Patient reports plan to meet with a  from [facility] to address housing issues, also reports a sober friend will be moving to the [employer].    T) Patient educated on Assertive Communication Skills. Patient has  completed 19 of 90 hours of program at this time. Projected discharge date is TBD. Current discharge plan is TBD.       GONZALO Cloud  3/30/2017, 10:26 AM      D) Patient was staffed this date.  Present were IZZY Russell and GONZALO Cloud      Weekly Educational Topics Date Education   1. Understanding Dual Diagnoses, week 1         Understanding Dual Diagnoses, week 2         2. Coping with Stress         3. Managing Difficult Feelings 3/8, 3/10        4. Building Recovery Support 3/13        5. Managing Thinking Problems 3/21, 3/24        6. Developing Assertive Communication Skills 3/27        7. The Art of Change        8. Relapse Prevention        9. Relationships/Boundaries       10. Grief and Loss        11. Strengths         12. Dual Recovery Planning         Seeking Safety Unit every Tuesday         HIV Education         Mindfulness/Meditation every Friday 3/10, 3/24      OT/RT every Wednesday for one hour

## 2021-06-09 NOTE — PROGRESS NOTES
Weekly Progress Note  Alicia Chase  1965  [id number]      D) Pt attended 1 groups  this week with 2 absences and 1 unexcused absence. A) Staff facilitated groups and reviewed tx progress.  Patient's treatment plan is created but not signed at this time.   Assessed for [facility]. R) No VAP needed at this time. Pt working on the following dimensions:  Dimension #1 - Withdrawal Potential - Risk 0, No concerns. Patient reports continued abstinence. Patient reports some urges but has been going to meetings and distracting herself (Method 1).      Dimension #2 - Biomedical - Risk 1, mild concern. Patient reports medical concerns including chronic neck and back pain, carpal tunnel, hypothyroidism, anemia, and GERD which she reports is from having gastric bypass surgery in . Patient reports that she does receive primary care through the [facility] in [facility] and reported appointments this week.     Dimension #3 - Emotional/Behavioral/Cognitive -Risk 2, moderate concern. Patient met with individual counselor this week, and discuss that currently grief is a significant issue, not only with her most recent marriage but also with her  that  (Method 2). Patient reports that she is currently pursuing mental health services [facility] the [facility] (Method 1).      Dimension #4 - Treatment Acceptance/Resistance - Risk 0, no concerns. Patient participated in group discussion when present, only one group attending this week (Method 1).     Dimension #5 - Relapse Potential - Risk 3, serious concerns.  Patient reports continued abstinence, and reports she has been primarily distracting herself from use (Method 1).     Dimension #6 - Recovery Environment -Risk 2, moderate concern. Patient reported that she has started to make connections at her sober house, but has not attended any meetings yet (Method 1). Patient discussed individual with counselor that she has a goal to eventually get back to school (Method 3).     T) Patient educated on Building Recovery Support. Patient has completed 9 of 90 hours of program at this time. Projected discharge date is June 8th, 2017. Current discharge plan is TBD.       GONZALO Cloud  3/17/2017, 3:19 PM      D) Ct was staffed this date.  Present were IZZY Russell and GONZALO Cloud      Weekly Educational Topics Date Education   1. Understanding Dual Diagnoses, week 1         Understanding Dual Diagnoses, week 2         2. Coping with Stress         3. Managing Difficult Feelings 3/8, 3/10        4. Building Recovery Support 3/13        5. Managing Thinking Problems         6. Developing Assertive Communication Skills         7. The Art of Change        8. Relapse Prevention        9. Relationships/Boundaries       10. Grief and Loss        11. Strengths         12. Dual Recovery Planning         Seeking Safety Unit every Tuesday         HIV Education         Mindfulness/Meditation every Friday 3/10      OT/RT every Wednesday for one hour

## 2021-06-09 NOTE — PROGRESS NOTES
Weekly Progress Note  Alicia Chase  1965  [id number]      D) Pt attended 3 groups  this week with 1 excused absences, patient reported appointments with [facility]. A) Staff facilitated groups and reviewed tx progress.  Patient's treatment plan is created but not signed at this time.   Assessed for [facility]. R) No VAP needed at this time. Pt working on the following dimensions:  Dimension #1 - Withdrawal Potential - Risk 0, No concerns. Patient reports continued abstinence since 3/17/17, and discussed that she is not currently having any urges (Method 1).     Dimension #2 - Biomedical - Risk 1, mild concern. Patient reported no changes to physical health this past week, but reported having visits to [facility] during the week.  (Method 1).     Dimension #3 - Emotional/Behavioral/Cognitive -Risk 2, moderate concern. Patient reported feeling more confident this week and enjoying making a new life for herself in the [facility]. Patient reported completing her assessments with the [facility] and that therey are currently working on a referral for PTSD (Method 1).      Dimension #4 - Treatment Acceptance/Resistance - Risk 0, no concerns. Patient attend and participate in 3 groups this week, and reported after missing group having appointments at the [facility].  Patient and staff to continue to communicate regarding treatment participation (Method 1).     Dimension #5 - Relapse Potential - Risk 3, serious concerns.  Patient reports current abstinence and that it is becoming easier to manage addictive thinking (Method 1).   Dimension #6 - Recovery Environment -Risk 3, serious concern. Patient reports she is moving into a new sober house this next week and also reports having a close friend who is sober is also benefiting her recovery. Patient reports continued participation in sober support group meetings (Method 1)   T) Patient educated on the Art of Change. Patient has completed 31 of 90 hours of program at this time. Projected discharge date is  5/15/17. Current discharge plan is referral to Mindfulness/Relapse Prevention.       Viky Moreira [facility] [place]  4/9/2017, 11:53 AM          Weekly Educational Topics Date Education   1. Understanding Dual Diagnoses, week 1         Understanding Dual Diagnoses, week 2         2. Coping with Stress         3. Managing Difficult Feelings 3/8, 3/10        4. Building Recovery Support 3/13        5. Managing Thinking Problems 3/21, 3/24        6. Developing Assertive Communication Skills 3/27, 3/31        7. The Art of Change 4/3, 4/5, 4/7       8. Relapse Prevention        9. Relationships/Boundaries       10. Grief and Loss        11. Strengths         12. Dual Recovery Planning         Seeking Safety Unit every Tuesday 3/21, 3/27       HIV Education         Mindfulness/Meditation every Friday 3/10, 3/24, 3/31, 4/7      OT/RT every Wednesday for one hour 4/5

## 2021-06-09 NOTE — PROGRESS NOTES
Weekly Progress Note  Alicia Chase  1965  [id number]      D) Pt attended 1 groups  this week with 2 absences. Staff contacted patient regarding absences, patient reported that she is currently working on housing. A) Staff facilitated groups and reviewed tx progress.  Patient's treatment plan is created but not signed at this time.   Assessed for [facility]. R) No VAP needed at this time. Pt working on the following dimensions:  Dimension #1 - Withdrawal Potential - Risk 0, No concerns. Patient reported that she had used alcohol this past week, but had no use since 3/17/17. Patient discussed the use in group, and received feedback and encouragement (Method 1).     Dimension #2 - Biomedical - Risk 1, mild concern. Patient reports medical concerns including chronic neck and back pain, carpal tunnel, hypothyroidism, anemia, and GERD which she reports is from having gastric bypass surgery in 2011. Patient reported no changes to physical health this past week (Method 1).     Dimension #3 - Emotional/Behavioral/Cognitive -Risk 2, moderate concern. Patient did not discuss any changes with mental health this week (Method 1).      Dimension #4 - Treatment Acceptance/Resistance - Risk 0, no concerns. Patient reports desire to be in group, but reports that she is currently staying a hotel without transportation. Patient and staff to continue to communicate regarding treatment participation (Method 1).     Dimension #5 - Relapse Potential - Risk 3, serious concerns.  Patient alcohol this past week, but reports no use since 3/17 (Method 1).   Dimension #6 - Recovery Environment -Risk 3, serious concern. Patient reports that due to her alcohol use, she has been evicted from her sober house and is currently staying at a hotel. Counselor provided patient with link to [facility] direction and patient reported plan to work towards housing so that she can participate in treatment.    T) Patient educated on Managing Difficult Thinking. Patient  has completed 13 of 90 hours of program at this time. Projected discharge date is TBD. Current discharge plan is TBD.       GONZALO Cloud  3/23/2017, 2:03 PM      D) Patient was staffed this date.  Present were IZZY Russell and GONZALO Cloud      Weekly Educational Topics Date Education   1. Understanding Dual Diagnoses, week 1         Understanding Dual Diagnoses, week 2         2. Coping with Stress         3. Managing Difficult Feelings 3/8, 3/10        4. Building Recovery Support 3/13        5. Managing Thinking Problems 3/21        6. Developing Assertive Communication Skills         7. The Art of Change        8. Relapse Prevention        9. Relationships/Boundaries       10. Grief and Loss        11. Strengths         12. Dual Recovery Planning         Seeking Safety Unit every Tuesday         HIV Education         Mindfulness/Meditation every Friday 3/10      OT/RT every Wednesday for one hour

## 2021-06-09 NOTE — PROGRESS NOTES
Addiction Services - Initial Services Plan      Name:  Alicia Chase  :  1965       MRN:  [id number]     Goal Methods   1.  Acceptance of chemical dependency and mental illness as a disease. A.  Comply with all med/psych recommendations  B.  Complete preliminary interviews  C.  Attend all program functions  D.  Attend all individual counseling sessions  E.  Read all assigned literature  F.  Complete psychological testing as recommended  G.  Particpate in any necessary consultations     2.  Acceptance of my need and ability to change A.  Complete any assignments.  B.  Participate in conferences (P.O., , family)  C.  Participate in 12 Step/support groups  D.  Actively participate in treatment planning  E.  Complete all assignments given or recommended on Treatment Plan     3.  Acceptance of staff recommendations as a means to my recovery A.  Participate in all interviews for Continuum of Care Plans  B.  Familiarize self with recovery program and how this applies to your day-to-day behavior       Patient describes their immediate need:  Pt reports none  Are there any immediate Safety Needs such as (physical, stability, mobility):  Pt reports feeling hopeless--staff to continue to assess for SI risk.     Immediate Health Needs and Plan:  Pt reports none.   Vulnerable Adult:  No    [x] Continue Current Medications for: physical and mental health.   [] Request Consult for:  [] Notify Attending Physician about:  [] Other:      Issues to be addressed in the first sessions:    Become acquainted with group norms and other group members.    Set up time to meet with primary counselor 1:1.     Patient strengths and needs:  Strengths: smart, intelligent.     Needs: additional sober relationships and structure, relapse prevention coping skills.     Plan for patient for time between intake and completion of the treatment plan:  Remain abstinent from any illicit substance/alcohol use and start group on 3/8/17.    Participate in all treatment activities and assignments.     Staff Members' Titles authorized to Initiate Services are:    Director of Behavioral Service    Clinical Director of Chemical Dependency    Primary Counselor    MI/ALEXSANDRA Chakraborty. Counselor        Nursing Staff    Vulnerable Adult Review  [x] Review of the facility Abuse Prevention plan was reviewed with the patient  [x] No individual abuse plan is necessary  [] In addition to the facility Abuse Prevention plan, an Individual Abuse Plan will be put in place    I understand these goals to be the Treatment Goals of the Program, and I agree to the stated Methods in attempting to accomplish these goals.    Patient Signature:  _________________________Date:  ___________________    Staff Name/Title:  GONZALO Cloud  Date:  3/7/2017  Time: 10:52 AM

## 2021-06-10 NOTE — PROGRESS NOTES
Weekly Progress Note  Alicia Chase  1965  [id number]      D) Pt attended ZERO groups  this week with 4 excused absences. A) Staff facilitated groups and reviewed tx progress. Assessed for [facility]. R) Unable to assess along six dimensions or for VA due to lack of attendance.   T) Client has completed 58 of 90 hours of program at this time.  Projected discharge date is 6/15/17. Current discharge plan is referral to Mindfulness/Relapse Prevention.       Viky Moreira, MPS, [facility]  5/12/2017, 3:25 PM            Weekly Educational Topics Date Education   1. Understanding Dual Diagnoses, week 1         Understanding Dual Diagnoses, week 2         2. Coping with Stress         3. Managing Difficult Feelings 3/8, 3/10        4. Building Recovery Support 3/13        5. Managing Thinking Problems 3/21, 3/24        6. Developing Assertive Communication Skills 3/27, 3/31        7. The Art of Change 4/3, 4/5, 4/7       8. Relapse Prevention        9. Relationships/Boundaries 4/17, 4/18, 4/19      10. Grief and Loss 4/24, 4/25, 4/26, 4/28       11. Strengths 5/1, 5/2        12. Dual Recovery Planning         Seeking Safety Unit every Tuesday 3/21, 3/27, 4/18, 4/25       HIV Education         Mindfulness/Meditation every Friday 3/10, 3/24, 3/31, 4/7, 4/28      OT/RT every Wednesday for one hour 4/5, 4/19

## 2021-06-10 NOTE — PROGRESS NOTES
Weekly Progress Note  Alicia Chase  1965  [id number]      D) Pt attended 2 groups this week. A) Staff facilitated groups and reviewed tx progress.   [facility] for [facility]. R) No VAP needed at this time. Pt working on the following dimensions:  Dimension #1 - Withdrawal Potential - Risk 0, No concerns. Patient reports continued abstinence since last relapse, and that she attending recovery activities on a daily basis.  (Method 1).     Dimension #2 - Biomedical - Risk 1, mild concern. Patient reported no changes to physical health this past week (Method 1).     Dimension #3 - Emotional/Behavioral/Cognitive -Risk 2, moderate concern. Patient discussed [facility], which has been difficult, but beneficial so far (Method 1).      Dimension #4 - Treatment Acceptance/Resistance - Risk 1, mild concerns. Patient has attended schedule groups, appears engaged in group process (Method 1).      Dimension #5 - Relapse Potential - Risk 3, serious concerns.  Patient reports continued abstinence since 4/13, and has been engaging in recovery supports.    Dimension #6 - Recovery Environment -Risk 3, serious concern. Patient reports her primary supports are her sober house, recovery groups and her children (Method 1).    T) Patient educated on Dual Diagnosis, Week 2.  Patient has completed 69 of 90 hours of program at this time. Projected discharge date is 6/15/17. Current discharge plan is referral to Mindfulness/Relapse Prevention.       ARMINDA Cloud, [facility]  5/30/2017, 2:15 PM          Weekly Educational Topics Date Education   1. Understanding Dual Diagnoses, week 1 5/15, 5/16, 5/19        Understanding Dual Diagnoses, week 2 5/23, 5/26        2. Coping with Stress         3. Managing Difficult Feelings 3/8, 3/10        4. Building Recovery Support 3/13        5. Managing Thinking Problems 3/21, 3/24        6. Developing Assertive Communication Skills 3/27, 3/31        7. The Art of Change 4/3, 4/5, 4/7        8. Relapse Prevention        9. Relationships/Boundaries 4/17, 4/18, 4/19      10. Grief and Loss 4/24, 4/25, 4/26, 4/28       11. Strengths 5/1, 5/2        12. Dual Recovery Planning         Seeking Safety Unit every Tuesday 3/21, 3/27, 4/18, 4/25, 5/23       HIV Education         Mindfulness/Meditation every Friday 3/10, 3/24, 3/31, 4/7, 4/28, 5/26      OT/RT every Wednesday for one hour 4/5, 4/19

## 2021-06-10 NOTE — PROGRESS NOTES
Weekly Progress Note  Alicia Chase  1965  [id number]      D) Pt attended 4 groups this week with no absences. A) Staff facilitated groups and reviewed tx progress.   [facility] for [facility]. R) No VAP needed at this time. Pt working on the following dimensions:  Dimension #1 - Withdrawal Potential - Risk 0, No concerns. Patient reports continued abstinence since last relapse, and that she attending recovery activities on a daily basis.  (Method 1).     Dimension #2 - Biomedical - Risk 1, mild concern. Patient reported no changes to physical health this past week (Method 1).     Dimension #3 - Emotional/Behavioral/Cognitive -Risk 2, moderate concern. Patient discussed that grief has played a significant role in her recovery, and that drinking has been a way to avoid feelings. Patient discuss that currently she is experiencing grief related to the end of her marriage, but also with loss of some her previous roles (Method 2).      Dimension #4 - Treatment Acceptance/Resistance - Risk 1, mild concerns. Patient attended all scheduled groups this week and appeared actively engaged (Method 1).      Dimension #5 - Relapse Potential - Risk 3, serious concerns.  Patient reports returning to abstinence since 4/13, and has been engaging in recovery supports.   Dimension #6 - Recovery Environment -Risk 3, serious concern. Patient reports she has moved back into her original sober house and has a close friend in recovery.  Patient reports continued participation in sober support group meetings (Method 1)   T) Patient educated on Grief and Loss. Patient has completed 52 of 90 hours of program at this time. Projected discharge date is 5/15/17. Current discharge plan is referral to Mindfulness/Relapse Prevention.       ARMINDA Cloud, [facility]  4/29/2017, 12:10 PM          Weekly Educational Topics Date Education   1. Understanding Dual Diagnoses, week 1         Understanding Dual Diagnoses, week 2         2. Coping with Stress          3. Managing Difficult Feelings 3/8, 3/10        4. Building Recovery Support 3/13        5. Managing Thinking Problems 3/21, 3/24        6. Developing Assertive Communication Skills 3/27, 3/31        7. The Art of Change 4/3, 4/5, 4/7       8. Relapse Prevention        9. Relationships/Boundaries 4/17, 4/18, 4/19      10. Grief and Loss 4/24, 4/25, 4/26, 4/28       11. Strengths         12. Dual Recovery Planning         Seeking Safety Unit every Tuesday 3/21, 3/27, 4/18, 4/25       HIV Education         Mindfulness/Meditation every Friday 3/10, 3/24, 3/31, 4/7, 4/28      OT/RT every Wednesday for one hour 4/5, 4/19

## 2021-06-10 NOTE — PROGRESS NOTES
Weekly Progress Note  Alicia Chase  1965  [id number]      D) Patient attended ZERO groups  this week with 3 unexcused absences. A) Primarily counselor attempted to contact patient on 4/12. Staff facilitated groups and reviewed tx progress. [facility] for [facility]. R) Unable to assess along six dimensions or for [facility] due to lack of attendance.   T) Patient has completed 31 of 90 hours of MICD program at this time. Projected discharge date is TBD. Current discharge plan isTBD. If patient does not contact staff or attend group in the next week, patient will be discharged AWOL.        ARMINDA Cloud LADC  4/13/2017, 10:42 AM    D) Patient was staffed this week.  Present were IZZY Russell and ARMINDA Cloud LADC      Weekly Educational Topics Date Education   1. Understanding Dual Diagnoses, week 1         Understanding Dual Diagnoses, week 2         2. Coping with Stress         3. Managing Difficult Feelings 3/8, 3/10        4. Building Recovery Support 3/13        5. Managing Thinking Problems 3/21, 3/24        6. Developing Assertive Communication Skills 3/27        7. The Art of Change        8. Relapse Prevention        9. Relationships/Boundaries       10. Grief and Loss        11. Strengths         12. Dual Recovery Planning         Seeking Safety Unit every Tuesday         HIV Education         Mindfulness/Meditation every Friday 3/10, 3/24      OT/RT every Wednesday for one hour

## 2021-06-10 NOTE — PROGRESS NOTES
Weekly Progress Note  Alicia Chase  1965  [id number]      D) Pt attended 3 groups this week with one excused absence. A) Staff facilitated groups and reviewed tx progress.   Assessed for [facility]. R) No VAP needed at this time. Pt working on the following dimensions:  Dimension #1 - Withdrawal Potential - Risk 0, No concerns. Patient reports continued abstinence since last relapse, and that she attending recovery activities on a daily basis.  (Method 1).     Dimension #2 - Biomedical - Risk 1, mild concern. Patient reported no changes to physical health this past week (Method 1).     Dimension #3 - Emotional/Behavioral/Cognitive -Risk 2, moderate concern. Patient reported she feels more optimistic now. Patient also reported that she will be [facility] (Method 1).      Dimension #4 - Treatment Acceptance/Resistance - Risk 1, mild concerns. Patient attended three groups this week, and discussed with counselor needing to miss group for court (Method 1).     Dimension #5 - Relapse Potential - Risk 3, serious concerns.  Patient reports returning to abstinence since 4/13, and has been engaging in recovery supports.    Dimension #6 - Recovery Environment -Risk 3, serious concern. Patient reports that her support continues to grow and she is noticing improvements in her relationships.  Patient reports continued participation in sober support group meetings and noticed that she is building relationships with people at groups. (Method 1)   T) Patient educated on Dual Diagnosis, Week 1.  Patient has completed 64 of 90 hours of program at this time. Projected discharge date is 6/15/17. Current discharge plan is referral to Mindfulness/Relapse Prevention.       ARMINDA Cloud, [facility]  5/19/2017, 2:36 PM          Weekly Educational Topics Date Education   1. Understanding Dual Diagnoses, week 1 5/15, 5/16, 5/19        Understanding Dual Diagnoses, week 2         2. Coping with Stress         3.  Managing Difficult Feelings 3/8, 3/10        4. Building Recovery Support 3/13        5. Managing Thinking Problems 3/21, 3/24        6. Developing Assertive Communication Skills 3/27, 3/31        7. The Art of Change 4/3, 4/5, 4/7       8. Relapse Prevention        9. Relationships/Boundaries 4/17, 4/18, 4/19      10. Grief and Loss 4/24, 4/25, 4/26, 4/28       11. Strengths 5/1, 5/2        12. Dual Recovery Planning         Seeking Safety Unit every Tuesday 3/21, 3/27, 4/18, 4/25       HIV Education         Mindfulness/Meditation every Friday 3/10, 3/24, 3/31, 4/7, 4/28      OT/RT every Wednesday for one hour 4/5, 4/19

## 2021-06-10 NOTE — PROGRESS NOTES
*late entry for 5/5/17  Weekly Progress Note  Alicia Chase  1965  [id number]      D) Pt attended 2 groups this week with two excused absences. A) Staff facilitated groups and reviewed tx progress.   [facility] for [facility]. R) No VAP needed at this time. Pt working on the following dimensions:  Dimension #1 - Withdrawal Potential - Risk 0, No concerns. Patient reports continued abstinence since last relapse, and that she attending recovery activities on a daily basis.  (Method 1).     Dimension #2 - Biomedical - Risk 1, mild concern. Patient reported no changes to physical health this past week (Method 1).     Dimension #3 - Emotional/Behavioral/Cognitive -Risk 2, moderate concern. Patient reported that she is becoming more aware of her strengths in recovery and thinking more realistically.       Dimension #4 - Treatment Acceptance/Resistance - Risk 1, mild concerns. Patient attended two groups this week, and discussed with counselor needing to miss group for work. Patient also discussed with staff coming three days per week (Method 1).     Dimension #5 - Relapse Potential - Risk 3, serious concerns.  Patient reports returning to abstinence since 4/13, and has been engaging in recovery supports. Patient discussed that she noticed liquor stores, but she is using coping skills to avoid use (Method 1).   Dimension #6 - Recovery Environment -Risk 3, serious concern. Patient reports things are going positively at her sober house.  Patient reports continued participation in sober support group meetings and noticed that she is building relationships with people at groups. (Method 1)   T) Patient educated on Strengths.  Patient has completed 58 of 90 hours of program at this time. Projected discharge date is 6/15/17. Current discharge plan is referral to Mindfulness/Relapse Prevention.       ARMINDA Cloud, [facility]  5/9/2017, 6:08 PM          Weekly Educational Topics Date Education   1. Understanding Dual Diagnoses, week  1         Understanding Dual Diagnoses, week 2         2. Coping with Stress         3. Managing Difficult Feelings 3/8, 3/10        4. Building Recovery Support 3/13        5. Managing Thinking Problems 3/21, 3/24        6. Developing Assertive Communication Skills 3/27, 3/31        7. The Art of Change 4/3, 4/5, 4/7       8. Relapse Prevention        9. Relationships/Boundaries 4/17, 4/18, 4/19      10. Grief and Loss 4/24, 4/25, 4/26, 4/28       11. Strengths 5/1, 5/2        12. Dual Recovery Planning         Seeking Safety Unit every Tuesday 3/21, 3/27, 4/18, 4/25       HIV Education         Mindfulness/Meditation every Friday 3/10, 3/24, 3/31, 4/7, 4/28      OT/RT every Wednesday for one hour 4/5, 4/19

## 2021-06-10 NOTE — PROGRESS NOTES
Weekly Progress Note  Alicia Chase  1965  [id number]      D) Pt attended 3 groups  this week with 1 excused absences. A) Staff facilitated groups and reviewed tx progress.   Assessed for [facility]. R) No VAP needed at this time. Pt working on the following dimensions:  Dimension #1 - Withdrawal Potential - Risk 0, No concerns. Patient reported a significant relapse the previous week. Patient and counselor processed the relapse, and the patient identified needing to re-engage with her recovery program. (Method 1).     Dimension #2 - Biomedical - Risk 1, mild concern. Patient reported no changes to physical health this past week (Method 1).     Dimension #3 - Emotional/Behavioral/Cognitive -Risk 2, moderate concern. Patient identified that control and having the appearance of everything being well had worked in the past, but this doesn't currently work with her recovery. Patient reported mental health services scheduling was delayed (Method 1).      Dimension #4 - Treatment Acceptance/Resistance - Risk 1, mild concerns. Patient reports a strong desire to live a sober lifestyle and is re-engaging with recovery suports, but had missed all groups the week previous due to relapse (Method 1).      Dimension #5 - Relapse Potential - Risk 3, serious concerns.  Patient reports returning to abstinence since 4/13, and has been engaging in recovery supoorts.   Dimension #6 - Recovery Environment -Risk 3, serious concern. Patient reports she has moved back into her original sober house and has a close friend in recovery.  Patient reports continued participation in sober support group meetings (Method 1)   T) Patient educated on Relationships. Patient has completed 40 of 90 hours of program at this time. Projected discharge date is 5/15/17. Current discharge plan is referral to Mindfulness/Relapse Prevention.       ARMINDA Cloud, [facility]  4/24/2017, 2:05 PM          Weekly Educational Topics Date Education   1. Understanding  Dual Diagnoses, week 1         Understanding Dual Diagnoses, week 2         2. Coping with Stress         3. Managing Difficult Feelings 3/8, 3/10        4. Building Recovery Support 3/13        5. Managing Thinking Problems 3/21, 3/24        6. Developing Assertive Communication Skills 3/27, 3/31        7. The Art of Change 4/3, 4/5, 4/7       8. Relapse Prevention        9. Relationships/Boundaries 4/17, 4/18, 4/19      10. Grief and Loss        11. Strengths         12. Dual Recovery Planning         Seeking Safety Unit every Tuesday 3/21, 3/27, 4/18       HIV Education         Mindfulness/Meditation every Friday 3/10, 3/24, 3/31, 4/7      OT/RT every Wednesday for one hour 4/5, 4/19

## 2021-06-11 NOTE — PROGRESS NOTES
Weekly Progress Note  Alicia Chase  1965  [id number]      D) Pt attended ZERO groups  this week with excused absences. A) Staff facilitated groups and reviewed tx progress. Assessed for [facility]. R) Unable to assess along six dimensions or for [facility] due to lack of attendance.   T) Client has completed 69 of 90 hours of program at this time. Projected discharge date is 6/31/17. Current discharge plan is Relapse Prevention.       ARMINDA Cloud, [facility]  6/2/2017, 2:11 PM        Weekly Educational Topics Date Education   1. Understanding Dual Diagnoses, week 1 5/15, 5/16, 5/19        Understanding Dual Diagnoses, week 2 5/23, 5/26        2. Coping with Stress         3. Managing Difficult Feelings 3/8, 3/10        4. Building Recovery Support 3/13        5. Managing Thinking Problems 3/21, 3/24        6. Developing Assertive Communication Skills 3/27, 3/31        7. The Art of Change 4/3, 4/5, 4/7       8. Relapse Prevention        9. Relationships/Boundaries 4/17, 4/18, 4/19      10. Grief and Loss 4/24, 4/25, 4/26, 4/28       11. Strengths 5/1, 5/2        12. Dual Recovery Planning         Seeking Safety Unit every Tuesday 3/21, 3/27, 4/18, 4/25, 5/23       HIV Education         Mindfulness/Meditation every Friday 3/10, 3/24, 3/31, 4/7, 4/28, 5/26      OT/RT every Wednesday for one hour 4/5, 4/19

## 2021-06-11 NOTE — PROGRESS NOTES
Weekly Progress Note  Alicia Chase  1965  [id number]      D) Pt attended ZERO groups  this week with 2 absences. A) Staff facilitated groups and reviewed tx progress. Assessed for [facility]. R) Unable to assess along six dimensions or for [facility] due to lack of attendance.   T) Patient has completed 75 of 90 hours of program at this time. Projected discharge date is 7/30/17. Current discharge plan is referral to Mindfulness/Relapse Prevention.       Viky Moreira, [facility]  7/7/2017, 2:44 PM            Weekly Educational Topics Date Education   1. Understanding Dual Diagnoses, week 1 5/15, 5/16, 5/19        Understanding Dual Diagnoses, week 2 5/23, 5/26        2. Coping with Stress         3. Managing Difficult Feelings 3/8, 3/10        4. Building Recovery Support 3/13; 6/12        5. Managing Thinking Problems 3/21, 3/24        6. Developing Assertive Communication Skills 3/27, 3/31; 6/27        7. The Art of Change 4/3, 4/5, 4/7       8. Relapse Prevention        9. Relationships/Boundaries 4/17, 4/18, 4/19      10. Grief and Loss 4/24, 4/25, 4/26, 4/28       11. Strengths 5/1, 5/2        12. Dual Recovery Planning         Seeking Safety Unit every Tuesday 3/21, 3/27, 4/18, 4/25, 5/23       HIV Education         Mindfulness/Meditation every Friday 3/10, 3/24, 3/31, 4/7, 4/28, 5/26      OT/RT every Wednesday for one hour 4/5, 4/19

## 2021-06-11 NOTE — PROGRESS NOTES
Weekly Progress Note  Alicia Chase  1965  [id number]      D) Pt attended ZERO groups  this week with 2 absences. A) Staff facilitated groups and reviewed tx progress. Assessed for [facility]. R) Unable to assess along six dimensions or for [facility] due to lack of attendance.   T) Patient has completed 75 of 90 hours of program at this time. Projected discharge date is TBD. Current discharge plan is TBD. If patient does not present in the next week, patient will be discharged AWOL.       Viky Moreira, [facility], [facility]  7/14/2017, 3:14 PM            Weekly Educational Topics Date Education   1. Understanding Dual Diagnoses, week 1 5/15, 5/16, 5/19        Understanding Dual Diagnoses, week 2 5/23, 5/26        2. Coping with Stress         3. Managing Difficult Feelings 3/8, 3/10        4. Building Recovery Support 3/13; 6/12        5. Managing Thinking Problems 3/21, 3/24        6. Developing Assertive Communication Skills 3/27, 3/31; 6/27        7. The Art of Change 4/3, 4/5, 4/7       8. Relapse Prevention        9. Relationships/Boundaries 4/17, 4/18, 4/19      10. Grief and Loss 4/24, 4/25, 4/26, 4/28       11. Strengths 5/1, 5/2        12. Dual Recovery Planning         Seeking Safety Unit every Tuesday 3/21, 3/27, 4/18, 4/25, 5/23       HIV Education         Mindfulness/Meditation every Friday 3/10, 3/24, 3/31, 4/7, 4/28, 5/26      OT/RT every Wednesday for one hour 4/5, 4/19

## 2021-06-11 NOTE — PROGRESS NOTES
Weekly Progress Note  Alicia Chase  1965  [id number]      D) Pt attended ZERO groups  this week with 2 excused absences. A) Staff facilitated groups and reviewed tx progress. Assessed for [facility]. R) Unable to assess along six dimensions or for [facility] due to lack of attendance.   T)  Patient has completed 71 of 90 hours of program at this time. Projected discharge date is 7/15/17. Current discharge plan is referral to Mindfulness/Relapse Prevention.       Viky Moreira, MPS, [facility]  6/23/2017, 2:58 PM      Weekly Educational Topics Date Education   1. Understanding Dual Diagnoses, week 1 5/15, 5/16, 5/19        Understanding Dual Diagnoses, week 2 5/23, 5/26        2. Coping with Stress         3. Managing Difficult Feelings 3/8, 3/10        4. Building Recovery Support 3/13; 6/12        5. Managing Thinking Problems 3/21, 3/24        6. Developing Assertive Communication Skills 3/27, 3/31        7. The Art of Change 4/3, 4/5, 4/7       8. Relapse Prevention        9. Relationships/Boundaries 4/17, 4/18, 4/19      10. Grief and Loss 4/24, 4/25, 4/26, 4/28       11. Strengths 5/1, 5/2        12. Dual Recovery Planning         Seeking Safety Unit every Tuesday 3/21, 3/27, 4/18, 4/25, 5/23       HIV Education         Mindfulness/Meditation every Friday 3/10, 3/24, 3/31, 4/7, 4/28, 5/26      OT/RT every Wednesday for one hour 4/5, 4/19

## 2021-06-11 NOTE — PROGRESS NOTES
Weekly Progress Note  Alicia Chase  1965  [id number]      D) Pt attended ZERO groups  this week with 4 absences. A) Staff facilitated groups and reviewed tx progress. Assessed for [facility]. Patient communicated with staff, with plan to return to group on 6/12/17 R) Unable to assess along six dimensions or for [facility] due to lack of attendance.   T) Client has completed 69 of 90 hours of program at this time. Projected discharge date is 6/31/17. Current discharge plan is Relapse Prevention.       ARMINDA Cloud, [facility]  6/9/2017, 3:16 PM        Weekly Educational Topics Date Education   1. Understanding Dual Diagnoses, week 1 5/15, 5/16, 5/19        Understanding Dual Diagnoses, week 2 5/23, 5/26        2. Coping with Stress         3. Managing Difficult Feelings 3/8, 3/10        4. Building Recovery Support 3/13        5. Managing Thinking Problems 3/21, 3/24        6. Developing Assertive Communication Skills 3/27, 3/31        7. The Art of Change 4/3, 4/5, 4/7       8. Relapse Prevention        9. Relationships/Boundaries 4/17, 4/18, 4/19      10. Grief and Loss 4/24, 4/25, 4/26, 4/28       11. Strengths 5/1, 5/2        12. Dual Recovery Planning         Seeking Safety Unit every Tuesday 3/21, 3/27, 4/18, 4/25, 5/23       HIV Education         Mindfulness/Meditation every Friday 3/10, 3/24, 3/31, 4/7, 4/28, 5/26      OT/RT every Wednesday for one hour 4/5, 4/19

## 2021-06-11 NOTE — PROGRESS NOTES
"[facility]   Mental Health and Addiction Care   [facility], [facility], [employer]    [phone] or [phone]   Treatment Plan--Update      Patient  Name: Alicia Chase \"Jayme\"  MRN: [id number]   Counselor: ARMINDA Cloud, [facility] [place]    Title: Dimension 1 Withdrawal Potential, Risk level 0, no concerns  Plan Date:  6/2/2017  Diagnosis:  Alcohol Use Disorder, Severe (F10.20)  Problem: Patient reports date of last use of alcohol on 2/9/17, most recent pattern of use has been 0.75 mL bottle daily. Patient reports withdrawal concerns were resolved while in inpatient treatment.     Goal: Begin Date: 6/2/2017   Target Date: 8/2/17   Date Completed:   Maintain abstinence throughout Treatment in order to avoid experiencing withdrawal symptoms and to meet treatment expectations.     Method 1: Begin Date:6/2/2017  Target Date: 8/2/17  Date Completed:   Attend groups as directed and share thoughts, feelings and urges to use, as well as sober supports with staff and peers in order to maintain awareness of details shaping your recovery process.        Title: Dimension 2 Biomedical condition, Risk level 1, mild concerns  Plan Date: 6/2/2017  Diagnosis: Alcohol Use Disorder, Severe  Problem: Patient reports medical concerns including chronic neck and back pain, carpal tunnel, hypothyroidism, anemia, and GERD which she reports is from having gastric bypass surgery in 2011. Patient reports that she does receive primary care through the [facility] in [facility].     Goal: Begin Date: 6/2/2017   Target Date: 8/2/17  Practice living a healthy lifestyle on a daily basis with proper rest, nutrition and exercise.     Method 1: Begin Date: 6/2/2017  Target Date: 8/2/17    Date Completed:   Develop healthy eating, sleep and exercise habits while attending treatment in order to feel better and become more alert and focused during the day. Discuss in daily check-in any positive changes or challenges in making " health changes.         Title: Dimension 3, Emotional/behavioral/cognitive condition/concerns, Risk level 2, moderate concerns  Plan Date:  6/2/2017  Diagnosis: Alcohol Use Disorder, Severe (F10.20)    Problem: Patient reports mental health diagnoses of PTSD and depression.  [facility] but [facility] at [facility]. Patient reports PTSD and grief are significant concerns for her.     Goal: Begin Date: 6/2/2017   Target Date: 8/2/17  To address mental health concerns, including grief,  to positive affect substance abuse recovery.     Method 1: Begin Date:6/2/2017  Target Date: 8/2/17   Date Completed:   Patient to continue with mental health  and report back to counselor/group benefits or challenges.     Method 2: Begin Date: 6/2/2017  Target Date: 8/2/17   Date Completed:   Patient to read handout on grief, and to journal at least 1x per week about how grief is currently affecting her ongoing recovery.         Title: Dimension 4, Treatment Acceptance/Resistance, Risk level 0, no concerns  Plan Date:  6/2/2017  Diagnosis: Alcohol Use Disorder, Severe (F10.20)  Problem: Patient identifies that her drinking was causing problems and is seeking services.     Goal: Begin Date: 6/2/2017   Target Date: 8/2/17  Patient to participate in group to support ongoing recovery and increase current motivation.     Method 1: Begin Date: 6/2/2017  Target Date: 8/2/17   Date Completed:   Participate in MICD groups as scheduled and let staff know if you are unable to attend groups due to other responsibilities         Title: Dimension 5, Relapse potential, Risk level 3, serious concerns  Plan Date:  6/2/2017  Diagnosis: Alcohol Use Disorder, Severe (F10.20)    Problem: Patient reports short periods of abstinence in the past, reports current abstinence since inpatient treatment. Patient reports history of treatment services in  the past. Patient reports some understanding of relapse prevention skills, but reports needing more support.      Goal: Begin Date: 6/2/2017   Target Date: 8/2/17  To deal effectively with relapse triggers and stressors while building coping skills in order to handle life events without resorting to drug/alcohol use.     Method 1: Begin Date: 6/2/2017  Target Date: 8/2/17    Date Completed:   Patient to participate in MICD group, identify additional coping skills that would benefit her recovery and share in daily check-in how she is utilizing these coping skills to prevent use.     Method 2: Begin Date: 6/2/2017  Target Date: 8/2/17  Date Completed:   Patient to identify at leat 5 higher risk situations/emotions for alcohol relapse, and identify how she hopes to handle these situations. Patient to share with counselor    Method 3: Begin Date: 6/2/2017  Target Date: 8/2/17  Date Completed:   Patient to identify 5 ways she determines her self worth, 5 ways alcohol use keeps her from being the person she wants to be, and 5 things that help foster more positive thinking.      Title: Dimension 6, Recovery Environment, Risk level 2, moderate concerns  Plan Date:  6/2/2017  Diagnosis: Alcohol Use Disorder, Severe (F10.20)  Problem:  Patient reports she has recently moved into a sober house. Patient reports limited daily activity. Patient reports her primary support are from her adult children and sister. Patient reports DWI in 2016, must complete treatment in order to get her license reinstated.     Goal: Begin Date: 6/2/2017   Target Date: 8/2/17  To have increased supportive relationships and activities to strengthen ongoing recovery     Method 1: Begin Date: 6/2/2017  Target Date: 8/2/17    Date Completed:   Patient to attend recovery support groups and discuss in daily check-in challenges or benefits to participation.     Method 2: Begin Date: 6/2/2017  Target Date: 8/2/17    Date Completed:   Patient to identify what  her social  needs are in recovery and identify 5 ways that she plans to meet these needs.     Method 3: Begin Date: 6/2/2017  Target Date: 8/2/17   Date Completed:   Patient to 3 goals for her that she would to start working on, and identify how she would like to begin working on these goals---share with counselor.         By signing this document, I am acknowledging that I was actively and directly involved in the development of my treatment plan.       Patient  Signature_________________________________________         Date__________________        Staff Signature  GONZALO Cloud     Date: 6/2/2017, 1:28 PM

## 2021-06-11 NOTE — PROGRESS NOTES
Weekly Progress Note  Salvatore Alicia  1965  [id number]      D) Pt attended 1 groups this week and met individually with primary counselor. A) Staff facilitated groups and reviewed tx progress.   [facility]. R) No VAP needed at this time. Pt working on the following dimensions:  Dimension #1 - Withdrawal Potential - Risk 0, No concerns. Patient reported relapse on 5/22/17, but reported continued abstinence since that time (Method 1).     Dimension #2 - Biomedical - Risk 1, mild concern. Patient reported no changes to physical health this past week (Method 1).     Dimension #3 - Emotional/Behavioral/Cognitive -Risk 2, moderate concern. Patient reported that [facility], which she reports some apprehension about but also feels positive (Method 1). Patient also discussed experiencing grief related to her divorce, but that she is continuing to journal about it (Method 2).      Dimension #4 - Treatment Acceptance/Resistance - Risk 1, mild concerns. Patient and counselor met to discuss treatment progress, patient has communicated about missing group (Method 1). Patient reported desire to remain in MICD group through [facility].     Dimension #5 - Relapse Potential - Risk 3, serious concerns.  Patient reports continued abstinence since 5/22, and reported that she is being more mindful in situations involved alcohol (Method 1).   Dimension #6 - Recovery Environment -Risk 3, serious concern. Patient reports she has moved into her own apartment, is continuing to participate in recovery meetings (Method 1). Patient reports she is also looking into card playing groups (Method 2). Patient reports that she is currently on probation and will need to complete EHM and 48 hours in correction.    T) Patient educated on Communication Skills.  Patient has completed 75 of 90 hours of program at this time. Projected discharge date is 7/30/17. Current discharge plan is referral to  Mindfulness/Relapse Prevention.       Viky Moreira MPS, [facility] [place]  6/30/2017, 3:13 PM          Weekly Educational Topics Date Education   1. Understanding Dual Diagnoses, week 1 5/15, 5/16, 5/19        Understanding Dual Diagnoses, week 2 5/23, 5/26        2. Coping with Stress         3. Managing Difficult Feelings 3/8, 3/10        4. Building Recovery Support 3/13; 6/12        5. Managing Thinking Problems 3/21, 3/24        6. Developing Assertive Communication Skills 3/27, 3/31; 6/27        7. The Art of Change 4/3, 4/5, 4/7       8. Relapse Prevention        9. Relationships/Boundaries 4/17, 4/18, 4/19      10. Grief and Loss 4/24, 4/25, 4/26, 4/28       11. Strengths 5/1, 5/2        12. Dual Recovery Planning         Seeking Safety Unit every Tuesday 3/21, 3/27, 4/18, 4/25, 5/23       HIV Education         Mindfulness/Meditation every Friday 3/10, 3/24, 3/31, 4/7, 4/28, 5/26      OT/RT every Wednesday for one hour 4/5, 4/19

## 2021-06-11 NOTE — PROGRESS NOTES
Weekly Progress Note  Salvatore Alicia  1965  [id number]      D) Pt attended 1 groups this week. A) Staff facilitated groups and reviewed tx progress.   [facility]. R) No VAP needed at this time. Pt working on the following dimensions:  Dimension #1 - Withdrawal Potential - Risk 0, No concerns. Patient reported relapse on 5/22/17, but reported continued abstinence since that time (Method 1).     Dimension #2 - Biomedical - Risk 1, mild concern. Patient reported no changes to physical health this past week (Method 1).     Dimension #3 - Emotional/Behavioral/Cognitive -Risk 2, moderate concern. Patient reported feeling positive, but appeared emotional and reported individually to counselor that she was feeling stressed (Method 1).      Dimension #4 - Treatment Acceptance/Resistance - Risk 1, mild concerns. Patient communicated with staff about returning to group, but then left group abruptively on the 1 day that she has attended (Method 1).  Patient and counselor to meet individually next week to discuss treatment engagement.     Dimension #5 - Relapse Potential - Risk 3, serious concerns.  Patient reports continued abstinence since 5/22, but also did not disclose in group the most recent relapse.   Dimension #6 - Recovery Environment -Risk 3, serious concern. Patient reports her primary supports are her sober house, recovery groups and her children (Method 1).    T) Patient educated on Building Recovery Support.  Patient has completed 71 of 90 hours of program at this time. Projected discharge date is 7/15/17. Current discharge plan is referral to Mindfulness/Relapse Prevention.       ARMINDA Cloud, [facility] [place]  6/15/2017, 5:33 PM          Weekly Educational Topics Date Education   1. Understanding Dual Diagnoses, week 1 5/15, 5/16, 5/19        Understanding Dual Diagnoses, week 2 5/23, 5/26        2. Coping with Stress         3. Managing Difficult Feelings 3/8, 3/10        4. Building Recovery Support 3/13;  6/12        5. Managing Thinking Problems 3/21, 3/24        6. Developing Assertive Communication Skills 3/27, 3/31        7. The Art of Change 4/3, 4/5, 4/7       8. Relapse Prevention        9. Relationships/Boundaries 4/17, 4/18, 4/19      10. Grief and Loss 4/24, 4/25, 4/26, 4/28       11. Strengths 5/1, 5/2        12. Dual Recovery Planning         Seeking Safety Unit every Tuesday 3/21, 3/27, 4/18, 4/25, 5/23       HIV Education         Mindfulness/Meditation every Friday 3/10, 3/24, 3/31, 4/7, 4/28, 5/26      OT/RT every Wednesday for one hour 4/5, 4/19

## 2021-06-11 NOTE — PROGRESS NOTES
"D. Counselor met with patient 1:1 to discuss treatment progress. A. Counselor discussed with patient current progress in treatment, including trauma and grief issues. Counselor discussed patient's lapse on alcohol a few weeks ago, an encouraged her to consider how this event affects her recovery moving forward. Counselor also inquired about what the patient wants to focus on for the remainder of treatment. R. Patient reported that she feels that that things are going positive, has now moved into her own apartment and is continuing to work at [employer]. Patient reported that she will be beginning trauma work in a few weeks, and discussed that she feels that is still processing her grief from her previous relationships. Patient reported that her alcohol use a few week ago was a \"toast,\" but is aware of needing to be more mindful in situations involving alcohol. Patient reported that she will need to serve 48 hours of [facility] and then will be on EHM, and would like to remain in MICD during that time. Patient reported that overall she is feeling positive about her recovery. T. Counselor to continue to follow with patient regarding treatment progress.     ARMINDA Cloud, [facility]  6/29/2017, 10:33 AM    "
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
## 2021-06-12 NOTE — PROGRESS NOTES
[facility] CHEMICAL DEPENDENCY   DISCHARGE SUMMARY       NAME:  Alicia Chase   Physician:  Dr. Ramirez   MRN:  [id number] :  ARMINDA Cloud,  [facility]#:  [id number] Funding Source:  Medicare A[employer]   Admit Date: 17 Discharge Date: 17     :  1965 Hours Completed: 75   Initial Diagnosis: Alcohol Use Disorder, Severe (F10.20)   Final Diagnosis: Alcohol Use Disorder, Severe   Discharge Address:    [address] [place] [id number]        Discharge Type:  Absent Without Leave (AWOL)    Reasons for and circumstances of service termination:   Alicia has completed 75 hours of MICD outpatient treatment. Pt has unexcused absences 17-17. Staff contacted patient on 17, at which time patient reported plan to return to group on 17, but did not present on 17, 17, 17 and 17 It appears that Pt is no longer interested in engaging in treatment services at this time. Pt discharged on this date, 2017, AWOL       Dimension/Course of Treatment/Individualized Care:   1. Withdrawal Potential - Unable to assess at discharge due to lack of contact with staff. Last known risk ratings as of 17 as follows: Risk 0, No concerns. Patient reported relapse on 17, but reported continued abstinence since that time.       2. Biomedical Conditions and Complications - Unable to assess at discharge due to lack of contact with staff. Last known risk ratings as of 17 as follows: Risk 1, mild concern. Patient reported no changes to physical health this past week.       3. Emotional/Behavioral/Cognitive Conditions and Complications - Unable to assess at discharge due to lack of contact with staff. Last known risk ratings as of 17 as follows: Risk 2, moderate concern. Patient reported that [facility], which she reports some apprehension about but also feels positive.  Patient also discussed experiencing grief related to her divorce, but that she is continuing to journal about it.       4. Treatment Acceptance/Resistance - Unable to assess at discharge due to lack of contact with staff. Last known risk ratings as of 06/30/17 as follows: Risk 1, mild concerns. Patient and counselor met to discuss treatment progress, patient has communicated about missing group. Patient reported desire to remain in MICD group through [facility].        5. Relapse/Continued Use/Continued Problem Potential Risk level - Unable to assess at discharge due to lack of contact with staff. Last known risk ratings as of 06/30/17 as follows: Risk 3, serious concerns.  Patient reports continued abstinence since 5/22, and reported that she is being more mindful in situations involved alcohol,        6. Recovery Environment  - Unable to assess at discharge due to lack of contact with staff. Last known risk ratings as of 06/30/17 as follows: Patient reports she has moved into her own apartment, is continuing to participate in recovery meetings. Patient reports she is also looking into card playing groups. Patient reports that she is currently on probation and will need to complete EHM and 48 hours in prison.        Services Provided: Intake, assessment, treatment planning, education, group discussion, film, lectures, and recommendation at discharge.     Strengths: smart, intelligent.      Needs: additional sober relationships and structure, relapse prevention coping skills.       Program Involvement: Poor  Attendance: Poor  Ability to relate in group/   Other program activities: Fair  Assignment Completion: Fair  Overall Behavior: Fair  Reported Family/Significant   Other Involvement: Poor    Prognosis: Fair      Recommendations       Complete chemical health assessment to determine appropriate level of treamtent care.     Mental Health Referral  Individual Therapy      Physical Health Referral:  Personal Physician                 Counselor Name and Title:  ARMINDA Cloud, [facility]    Date:  7/22/2017  Time: 1:05 PM

## 2021-06-12 NOTE — PROGRESS NOTES
Alicia Chase attended 3 hours of group therapy today. Patient received her treatment plan and signed plan without any additional changes.     8/28/2017 2:47 PM Viky Moreira

## 2021-06-12 NOTE — PROGRESS NOTES
"[facility]   Mental Health and Addiction Care   [facility], [facility], [facility]    [phone] or [phone]   Treatment Plan--Update      Patient  Name: Alicia Chase \"Jayme\"  MRN: [id number]   Counselor: ARMINDA Cloud, [facility]    Title: Dimension 1 Withdrawal Potential, Risk level 0, no concerns  Plan Date:  8/24/2017  Problem: Patient reports date of last use use of alcohol on 5/22/17, does not display any withdrawal concerns at this time.    Goal: Begin Date: 8/24/2017   Target Date: 10/01/17  Date Completed:   Maintain abstinence throughout treatment in order to avoid experiencing withdrawal symptoms and to meet treatment expectations.     Method 1: Begin Date:8/24/2017   Target Date: 10/01/17  Date Completed:   Patient to avoid any illicit substance or alcohol use, and report any substance/alcohol use to primary counselor to determine if any changes need to be made to the treatment plan to address withdrawal.         Title: Dimension 2 Biomedical condition, Risk level 1, mild concerns  Plan Date: 8/24/2017  Problem: Patient reports medical concerns including chronic neck and back pain, carpal tunnel, hypothyroidism, anemia, and GERD which she reports is from having gastric bypass surgery in 2011. Patient reports that she does receive primary care through the [facility] in [facility].     Goal: Begin Date: 8/24/2017   Target Date: 10/01/17  Practice living a healthy lifestyle on a daily basis with proper rest, nutrition and exercise.     Method 1: Begin Date: 8/24/2017   Target Date: 10/01/17  Date Completed:   Patient to report any changes with her physical health that would impact treatment participation.         Title: Dimension 3, Emotional/behavioral/cognitive condition/concerns, Risk level 2, moderate concerns  Plan Date:  8/24/2017  Problem: Patient reports mental health diagnoses of PTSD and depression.  [facility] but [facility] in receiving mental health services at [facility]. Patient reports PTSD and grief are significant concerns for her.     Goal: Begin Date: 8/24/2017   Target Date: 10/01/17  To address mental health concerns,   to positive affect substance abuse recovery.     Method 1: Begin Date:8/24/2017   Target Date: 10/01/17   Date Completed:   Patient to meet with primary counselor or other  staff for the purpose of completed a diagnostic assessment, or sign release of information from current  provider.    Method 2: Begin Date: 8/24/2017   Target Date: 10/01/17  Date Completed:   Patient to discuss with counselor potential referral for trauma therapy at [facility].       Title: Dimension 4, Treatment Acceptance/Resistance, Risk level 2, moderate concerns  Plan Date:  8/24/2017  Problem: Patient identifies that her drinking was causing problems and is seeking services, but also patient appears externally motivated by probation. Patient was previously discharged from outpatient treatment due to lack of attendance.      Goal: Begin Date: 8/24/2017   Target Date: 10/01/17  Patient to participate in group to support ongoing recovery and increase current motivation.     Method 1: Begin Date: 8/24/2017   Target Date: 10/01/17  Date Completed:   Patient to attend all schedule MICD groups or 1:1s, and contact staff if unable to attend. ([facility]: [phone])    Method 2: Begin Date:8/24/2017   Target Date: 10/01/17   Date Completed:  Patient to attend at least 2 groups per week, patient may only miss group due to medical concerns or previously discussed excused absences. If patient is unable to do this, patient will be discharged.       Title: Dimension 5, Relapse potential, Risk level 3, serious concerns  Plan Date:  8/24/2017   Problem: Patient reports short periods of abstinence over the past year, but also reports using alcohol while in outpatient treatment.     Goal: Begin Date:  8/24/2017   Target Date: 10/01/17  To deal effectively with relapse triggers and stressors while building coping skills in order to handle life events without resorting to drug/alcohol use. This goal and method must be complete for patient to complete MICD outpatient treatment.     Method 1: Begin Date: 8/24/2017   Target Date: 10/01/17    Date Completed:   Patient to participate in MICD group, identify additional coping skills that would benefit her recovery and share in daily check-in how she is utilizing these coping skills to prevent use.     Method 2: Begin Date: 8/24/2017   Target Date: 10/01/17  Date Completed:   Patient to identify at least 5 higher risk situations/emotions for alcohol relapse, and identify how she hopes to handle these situations. Patient to share with counselor    Method 3: Begin Date: 8/24/2017   Target Date: 10/01/17  Date Completed:   Patient to identify 5 ways she determines her self worth, 5 ways alcohol use keeps her from being the person she wants to be, and 5 things that help foster more positive thinking.      Title: Dimension 6, Recovery Environment, Risk level 2, moderate concerns  Plan Date:  8/24/2017  Diagnosis: Alcohol Use Disorder, Severe (F10.20)  Problem:  Patient reports she has recently moved into a sober house. Patient reports limited daily activity. Patient reports her primary support are from her adult children and sister. Patient reports DWI in 2016, must complete treatment in order to get her license reinstated.     Goal: Begin Date: 8/24/2017   Target Date: 10/01/17  To have increased supportive relationships and activities to strengthen ongoing recovery     Method 1: Begin Date: 8/24/2017   Target Date: 10/01/17    Date Completed:   Patient to attend recovery support groups and discuss in daily check-in challenges or benefits to participation.     Method 2: Begin Date: 8/24/2017   Target Date: 10/01/17    Date Completed:   Patient to 3 goals for her that she  would to start working on, and identify how she would like to begin working on these goals---share with counselor.         By signing this document, I am acknowledging that I was actively and directly involved in the development of my treatment plan.       Patient  Signature_________________________________________         Date__________________        Staff Signature  GONZALO Cloud     Date: 8/24/2017, 9:25 AM

## 2021-06-12 NOTE — PROGRESS NOTES
Patient was originally scheduled to meet with primary counselor on 8/16/17 for the purpose of treatment planning, but patient did not attend 1:1 or group on that date. Patient primary counselor attempted to contact patient regarding absence. Patient's primary counselor was then out of the office from 8/18/17-8/22/17. Patient left message regarding absences on 8/23/17, citing work schedule. Counselor contact patient on 8/24/17, patient stated that she is scheduled to work on 8/25/17 and then will be out of town from 8/29/17-09/04/17. Counselor explained to patient that following 09/04/17, patient will need to attend group at least 2x per week to remain enrolled in program. Counselor to review preliminary treatment plan with patient on 8/28/17.

## 2021-06-12 NOTE — PROGRESS NOTES
Weekly Progress Note  Buffy Chasee  1965  [id number]      D) Patient attended 1 groups  this week with 1 unexcused absences. A) Staff facilitated groups and reviewed treatment progress. [facility] for [facility]. R) No VAP needed at this time. Pt working on the following dimensions:  Dimension #1 - Withdrawal Potential - Risk 0, no concerns. Patient reports date of last use use of alcohol on 5/22/17, does not display any withdrawal concerns at this time.  Dimension #2 - Biomedical - Risk 1, mild concerns. Patient reports chronic health conditions, currently is taking medications as prescribed. Patient reports no changes in physical health over the past week.   Dimension #3 - Emotional/Behavioral/Cognitive - Risk 2, moderate concerns. Patient reports history of mental health diagnoses of PTSD and depression. Patient reports that she [facility] but like to do individual therapy after treatment.  Dimension #4 - Treatment Acceptance/Resistance - Risk 2, moderate concerns. Patient appears externally motivated by probation. Patient reported plan to attend group and meet with primary counselor on 8/16/17 but did not attend group or meet with counselor, and did not communicate with counselor regarding absence. Counselor meet with patient and develop behavior contract for continued participation in treatment.   Dimension #5 - Relapse Potential - Risk 3, serious concerns. Patient reports short periods of abstinence over the past year, but also reports using alcohol while in outpatient treatment.   Dimension #6 - Recovery Environment - Risk 2, moderate concerns. Patient reports working full time and lives alone. Patient reports that EHM ended on 8/12, remains on probation at this time.   T) Patient educated on Dual Diagnosis. Patient has completed 6 of 90 hours of program at this time. Projected discharge date is TBD. Current discharge plan is TDB.     ARMINDA Cloud, [facility]  8/20/2017,  3:47 PM       Weekly Educational Topics Date Education   1. Understanding Dual Diagnoses, week 1 8/18    2. Understanding Dual Diagnoses, week 2     3. Stress Management     4. Managing Difficult Feelings     5. Managing Thinking Problems     6. Building Recovery Support     7. Developing Assertive Communication Skills     8. Relapse Prevention     9. Relationships/Boundaries     10. Grief and Loss     11. Strengths     12. Wellness 8/11    Seeking Safety Unit every Tuesday     Mindfulness every Friday 8/11, 8/18

## 2021-06-12 NOTE — PROGRESS NOTES
*Late entry  Weekly Progress Note  Alicia Chase  1965  [id number]      D) Patient attended 1 groups  this week with 1 excused absences. A) Staff facilitated groups and reviewed treatment progress. [facility] for [facility]. R) No VAP needed at this time. Pt working on the following dimensions:  Dimension #1 - Withdrawal Potential - Risk 0, no concerns. Patient reports date of last use use of alcohol on 5/22/17, does not display any withdrawal concerns at this time (Method 1).  Dimension #2 - Biomedical - Risk 1, mild concerns. Patient reports chronic health conditions, currently is taking medications as prescribed. Patient reports no changes in physical health over the past week (Method 1).   Dimension #3 - Emotional/Behavioral/Cognitive - Risk 2, moderate concerns. Patient reports history of mental health diagnoses of PTSD and depression. Patient reports that she [facility] but like to do individual therapy after treatment. Patient discussed in group some of her frustrations towards her best friend, which she often internalizes as being her fault. Group and staff provided patient feedback about how these events reinforce her negative self-talk. Counselor and patient have yet to meet to discuss DA or referral to trauma therapy (Method 1/2).   Dimension #4 - Treatment Acceptance/Resistance - Risk 2, moderate concerns. Patient appears externally motivated by probation. Patient communicated with staff about missing group this week due to patient's daughter's wedding (Method 1). Patient reports understanding expectation of attending at least 2x per week starting next week (Method 2).   Dimension #5 - Relapse Potential - Risk 3, serious concerns. Patient reports short periods of abstinence over the past year, but also reports using alcohol while in outpatient treatment. Patient did not report any use in the past week, but reported interpersonal stress appears to be a trigger for urges  (Method 1).   Dimension #6 - Recovery Environment - Risk 2, moderate concerns. Patient reports working full time and lives alone. Patient reports that EHM ended on 8/12, remains on probation at this time. Patient reported continued AA participation multiple times per week.   T) Patient educated on Stress Management. Patient has completed 9 of 90 hours of program at this time. Projected discharge date is TBD. Current discharge plan is TDB.     ARMINDA Cloud, [facility]  9/4/2017, 2:46 PM       Weekly Educational Topics Date Education   1. Understanding Dual Diagnoses, week 1 8/18    2. Understanding Dual Diagnoses, week 2     3. Stress Management 8/28    4. Managing Difficult Feelings     5. Managing Thinking Problems     6. Building Recovery Support     7. Developing Assertive Communication Skills     8. Relapse Prevention     9. Relationships/Boundaries     10. Grief and Loss     11. Strengths     12. Wellness 8/11    Seeking Safety Unit every Tuesday     Mindfulness every Friday 8/11, 8/18

## 2021-06-12 NOTE — PROGRESS NOTES
Addiction Services - Initial Services Plan      Name:  lAicia Chase  :  1965       MRN:  [id number]     Goal Methods   1.  Acceptance of chemical dependency and mental illness as a disease. A.  Comply with all med/psych recommendations  B.  Complete preliminary interviews  C.  Attend all program functions  D.  Attend all individual counseling sessions  E.  Read all assigned literature  F.  Complete psychological testing as recommended  G.  Particpate in any necessary consultations     2.  Acceptance of my need and ability to change A.  Complete any assignments.  B.  Participate in conferences (P.O., , family)  C.  Participate in 12 Step/support groups  D.  Actively participate in treatment planning  E.  Complete all assignments given or recommended on Treatment Plan     3.  Acceptance of staff recommendations as a means to my recovery A.  Participate in all interviews for Continuum of Care Plans  B.  Familiarize self with recovery program and how this applies to your day-to-day behavior       Patient describes their immediate need:  Pt reports none  Are there any immediate Safety Needs such as (physical, stability, mobility):  Pt reports none.    Immediate Health Needs and Plan:  Pt reports none.   Vulnerable Adult:  No    [] Continue Current Medications for:   [] Request Consult for:  [] Notify Attending Physician about:  [] Other:      Issues to be addressed in the first sessions:    Become acquainted with group norms and other group members.    Set up time to meet with primary counselor 1:1.     Patient strengths and needs:  Strengths: willingness to learn, gets along with people    Needs: additional sober relationships and structure, relapse prevention coping skills.     Plan for patient for time between intake and completion of the treatment plan:  Remain abstinent from any illicit substance/alcohol use and start group on   Participate in all treatment activities and  assignments.     Staff Members' Titles authorized to Initiate Services are:    Director of Behavioral Service    Clinical Director of Chemical Dependency    Primary Counselor    MI/ALEXSANDRA Sr. Counselor        Nursing Staff    Vulnerable Adult Review  [x] Review of the facility Abuse Prevention plan was reviewed with the patient  [x] No individual abuse plan is necessary  [] In addition to the facility Abuse Prevention plan, an Individual Abuse Plan will be put in place    I understand these goals to be the Treatment Goals of the Program, and I agree to the stated Methods in attempting to accomplish these goals.            Patient Signature:  _________________________Date:  ___________________    Staff Name/Title:  ARMINDA Dejesus, [facility]  Date:  8/9/2017  Time: 9:10 AM

## 2021-06-12 NOTE — PROGRESS NOTES
Alicia Chase attended 3 hours of group therapy today.    Sabrina Rogers MA, LMFT, [facility]  8/18/2017, 1:53 PM

## 2021-06-12 NOTE — PROGRESS NOTES
*Late entry  Weekly Progress Note  Alicia Chase  1965  [id number]      D) Patient attended 2 groups this week with no absences. A) Staff facilitated groups and reviewed treatment progress. [facility] for [facility]. R) No VAP needed at this time. Pt working on the following dimensions:  Dimension #1 - Withdrawal Potential - Risk 0, no concerns. Patient reports date of last use use of alcohol on 5/22/17, does not display any withdrawal concerns at this time (Method 1).  Dimension #2 - Biomedical - Risk 1, mild concerns. Patient reports chronic health conditions, currently is taking medications as prescribed. Patient reports no changes in physical health over the past week (Method 1).   Dimension #3 - Emotional/Behavioral/Cognitive - Risk 2, moderate concerns. Patient reports history of mental health diagnoses of PTSD and depression. Patient reports that she [facility] but like to do individual therapy after treatment. Patient discussed stressors related to her daughter's wedding, and that she will often internalize problems with others to be about herself. Counselor and patient have yet to meet to discuss DA or referral to trauma therapy (Method 1/2).   Dimension #4 - Treatment Acceptance/Resistance - Risk 2, moderate concerns. Patient appears externally motivated by probation. Patient communicated with staff about which days she is able to attended, and making sure that it is at least 2x per week (Method 1/2).    Dimension #5 - Relapse Potential - Risk 3, serious concerns. Patient reports short periods of abstinence over the past year, but also reports using alcohol while in outpatient treatment. Patient did not report any use in the past week, but reported significant urges on 9/8/17 (Method 1).   Dimension #6 - Recovery Environment - Risk 2, moderate concerns. Patient reports working full time and lives alone. Patient reports that EHM ended on 8/12, remains on probation at this  time. Patient reported continued AA participation multiple times per week.   T) Patient educated on Feelings. Patient has completed 15 of 90 hours of program at this time. Projected discharge date and plan is to be determined, patient may be referred to Relapse Prevention prior to 90 depending on treatment attendance and participation.     ARMINDA Cloud, [facility]  9/11/2017, 4:29 PM       Weekly Educational Topics Date Education   1. Understanding Dual Diagnoses, week 1 8/18    2. Understanding Dual Diagnoses, week 2     3. Stress Management 8/28    4. Managing Difficult Feelings 9/5, 9/8    5. Managing Thinking Problems     6. Building Recovery Support     7. Developing Assertive Communication Skills     8. Relapse Prevention     9. Relationships/Boundaries     10. Grief and Loss     11. Strengths     12. Wellness 8/11    Seeking Safety Unit every Tuesday     Mindfulness every Friday 8/11, 8/18, 9/8

## 2021-06-12 NOTE — PROGRESS NOTES
Alicia Chase attended 3 hours of group therapy today. Today was patient's first day of group. Patient introduced herself and was welcomed by group.     8/11/2017 2:43 PM Viky Moreira

## 2021-06-13 NOTE — PROGRESS NOTES
Weekly Progress Note  Alicia Chase  1965  [id number]      D) Pt attended ZERO groups  this week with excused absences. A) Staff facilitated groups and reviewed tx progress. [facility]essed for [facility]. Staff to contact patient about anticipated return to group. R) Unable to assess along six dimensions or for [facility] due to lack of attendance.   T) Patient has completed 39 of 90 program hours at this time. Projected discharge date and plan is to be determined, patient may be referred to Relapse Prevention prior to 90 depending on treatment attendance and participation.       Viky Moreira, MPS, [facility]  10/26/2017, 12:18 PM       Weekly Educational Topics Date Education   1. Understanding Dual Diagnoses, week 1 8/18    2. Understanding Dual Diagnoses, week 2     3. Stress Management 8/28    4. Managing Difficult Feelings 9/5, 9/8    5. Managing Thinking Problems 9/12, 9/15    6. Building Recovery Support 9/20, 9/22    7. Relationships/Communication 9/26    8. Addiction 101     9.Relapse Prevention 10/11, 10/13    10. Grief and Loss 10/16    11. Strengths     12. Wellness 8/11    Seeking Safety Unit every Tuesday     Mindfulness every Friday 8/11, 8/18, 9/8, 9/15, 9/22, 10/13

## 2021-06-13 NOTE — PROGRESS NOTES
Weekly Progress Note  Alicia Chase  1965  [id number]      D) Pt attended 1 groups  this week with excused absences. A) Staff facilitated groups and reviewed tx progress. [facility]essed for [facility]. Staff to contact patient about anticipated return to group. R) Unable to assess along six dimensions or for [facility] due counselor not being present during patient's attendance.   T) Patient has completed 42 of 90 program hours at this time. Projected discharge date and plan is to be determined, patient may be referred to Relapse Prevention prior to 90 depending on treatment attendance and participation.       Viky Moreira, ARMINDA, [facility]  11/3/2017, 2:45 PM       Weekly Educational Topics Date   1. Understanding Dual Diagnoses, week 1 8/18   2. Understanding Dual Diagnoses, week 2    3. Stress Management 8/28   4. Managing Difficult Feelings 9/5, 9/8   5. Managing Thinking Problems 9/12, 9/15   6. Building Recovery Support 9/20, 9/22   7. Relationships/Communication 9/26   8. Addiction 101    9.Relapse Prevention 10/11, 10/13   10. Grief and Loss 10/16   11. Strengths    12. Wellness 8/11, 11/1   Seeking Safety Unit every Tuesday    Mindfulness every Friday 8/11, 8/18, 9/8, 9/15, 9/22, 10/13

## 2021-06-13 NOTE — PROGRESS NOTES
Weekly Progress Note  Alicia Chase  1965  [id number]      D) Patient attended 2 groups this week with no absences. A) Staff facilitated groups and reviewed treatment progress. [facility] for [facility]. R) No VAP needed at this time. Pt working on the following dimensions:  Dimension #1 - Withdrawal Potential - Risk 0, no concerns. Patient reports date of last use use of alcohol on 5/22/17, does not display any withdrawal concerns at this time (Method 1).  Dimension #2 - Biomedical - Risk 1, mild concerns. Patient reports chronic health conditions, currently is taking medications as prescribed. Patient reports no changes in physical health over the past week (Method 1).   Dimension #3 - Emotional/Behavioral/Cognitive - Risk 2, moderate concerns. Patient reports history of mental health diagnoses of PTSD and depression. Patient reports that she [facility] but like to do individual therapy after treatment. Patient discussed stressors related to her daughter's wedding, and that she will often internalize problems with others to be about herself. Patient is currently schedule for a DA with Suzan Dent on 10/11/17 (Method 1/2).   Dimension #4 - Treatment Acceptance/Resistance - Risk 1, mild concerns. Patient appears externally motivated by probation. Patient communicated staff about missing group on Tuesday and attended on Wednesday and Friday (Method 1/2).    Dimension #5 - Relapse Potential - Risk 3, serious concerns. Patient reports short periods of abstinence over the past year, but also reports using alcohol while in outpatient treatment. Patient did not report any use in the past week (Method 1).   Dimension #6 - Recovery Environment - Risk 2, moderate concerns. Patient reports working full time and lives alone. Patient reports that EHM ended on 8/12, remains on probation at this time. Patient reported continued AA participation multiple times per week.   T) Patient educated  on Recovery Support. Patient has completed 27 of 90 hours of program at this time. Projected discharge date and plan is to be determined, patient may be referred to Relapse Prevention prior to 90 depending on treatment attendance and participation.     ARMINDA Cloud, [facility]  9/23/2017, 11:00 AM       Weekly Educational Topics Date Education   1. Understanding Dual Diagnoses, week 1 8/18    2. Understanding Dual Diagnoses, week 2     3. Stress Management 8/28    4. Managing Difficult Feelings 9/5, 9/8    5. Managing Thinking Problems 9/12, 9/15    6. Building Recovery Support 9/20, 9/22    7. Developing Assertive Communication Skills     8. Relapse Prevention     9. Relationships/Boundaries     10. Grief and Loss     11. Strengths     12. Wellness 8/11    Seeking Safety Unit every Tuesday     Mindfulness every Friday 8/11, 8/18, 9/8, 9/15, 9/22
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
## 2021-06-13 NOTE — PROGRESS NOTES
Weekly Progress Note  Alicia Chase  1965  [id number]      D) Pt attended 1 groups  this week with 1 excused absences. A) Staff facilitated groups and reviewed tx progress. Assessed for [facility]. R) Unable to assess along six dimensions or for [facility] due primary counselor's absence when patient attended group.   T)  Patient has completed 27 of 90 hours of program at this time. Projected discharge date and plan is to be determined, patient may be referred to Relapse Prevention prior to 90 depending on treatment attendance and participation.     ARMINDA Cloud, [facility]  9/29/2017, 4:08 PM       Weekly Educational Topics Date Education   1. Understanding Dual Diagnoses, week 1 8/18    2. Understanding Dual Diagnoses, week 2     3. Stress Management 8/28    4. Managing Difficult Feelings 9/5, 9/8    5. Managing Thinking Problems 9/12, 9/15    6. Building Recovery Support 9/20, 9/22    7. Relationships/Communication 9/26    8. Addiction 101     9. Relapse Prevention     10. Grief and Loss     11. Strengths     12. Wellness 8/11    Seeking Safety Unit every Tuesday     Mindfulness every Friday 8/11, 8/18, 9/8, 9/15, 9/22
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
## 2021-06-13 NOTE — PROGRESS NOTES
Weekly Progress Note  Alicia Chase  1965  [id number]      D) Pt attended no groups this week with excused absences. Patient contacted staff stating she would most likely be unable to return to group until after next week. A) Staff facilitated groups and reviewed tx progress. Ass[facility] for [facility]. R) Unable to assess along six dimensions or for [facility] due primary counselor's absence when patient attended group.   T)  Patient has completed 27 of 90 hours of program at this time. Projected discharge date and plan is to be determined, patient may be referred to Relapse Prevention prior to 90 depending on treatment attendance and participation.     ARMINDA Cloud, [facility]  10/6/2017, 3:27 PM       Weekly Educational Topics Date Education   1. Understanding Dual Diagnoses, week 1 8/18    2. Understanding Dual Diagnoses, week 2     3. Stress Management 8/28    4. Managing Difficult Feelings 9/5, 9/8    5. Managing Thinking Problems 9/12, 9/15    6. Building Recovery Support 9/20, 9/22    7. Relationships/Communication 9/26    8. Addiction 101     9. Relapse Prevention     10. Grief and Loss     11. Strengths     12. Wellness 8/11    Seeking Safety Unit every Tuesday     Mindfulness every Friday 8/11, 8/18, 9/8, 9/15, 9/22

## 2021-06-14 NOTE — PROGRESS NOTES
Alicia Chase attended 1 hours of group therapy today. Patient has successfully completed MICD as of this date, discharge summary to follow.     11/28/2017 4:35 PM Viky Moreira

## 2021-06-14 NOTE — PROGRESS NOTES
Weekly Progress Note  Alicia Chase  1965  [id number]      D) Pt attended 1 groups  this week with excused absences. A) Staff facilitated groups and reviewed tx progress. [facility] for [facility]. Staff to contact patient about anticipated return to group. R) Unable to assess along six dimensions or for [facility] due counselor not being present during patient's attendance.   T) Patient has completed 45 of 90 program hours at this time. Projected discharge date and plan is to be determined, patient may be referred to Relapse Prevention prior to 90 depending on treatment attendance and participation.       Viky Moreira, ARMINDA, [facility]  11/10/2017, 4:27 PM       Weekly Educational Topics Date   1. Understanding Dual Diagnoses, week 1 8/18; 11/10   2. Understanding Dual Diagnoses, week 2    3. Stress Management 8/28   4. Managing Difficult Feelings 9/5, 9/8   5. Managing Thinking Problems 9/12, 9/15   6. Building Recovery Support 9/20, 9/22   7. Relationships/Communication 9/26   8. Addiction 101    9.Relapse Prevention 10/11, 10/13   10. Grief and Loss 10/16   11. Strengths    12. Wellness 8/11, 11/1   Seeking Safety Unit every Tuesday    Mindfulness every Friday 8/11, 8/18, 9/8, 9/15, 9/22, 10/13

## 2021-06-14 NOTE — PROGRESS NOTES
VARGAS. Counselor met with patient for 15 minutes to discuss treatment progress. A. Counselor inquired about patient's current status. Counselor discussed following up on Friday about plan for patient going forward. R. Patient reported that she is struggling with cravings, and looking into inpatient CD/PTSD treatment program at the [facility]. Patient reported wanting to start this as soon as possible, but would like to remain in the group if she is unable to get into this treatment. T. Counselor to follow up with patient on 11/17/17.     Patient treatment was not reviewed, due to patient's circumstances.     ARMINDA Cloud, [facility]  11/15/2017, 2:42 PM

## 2021-06-14 NOTE — PROGRESS NOTES
Individual Treatment Plan--Update    Patient  Name: Alicia Chase  MRN: [id number]   : 1965  Admit Date: 17  Date of Initial Service Plan: 17  Tentative Discharge Date: 17    Counselor: ARMINDA Cloud, [facility]       Dimension 1: Acute Intoxication/Withdrawal Potential, Risk level: 0    Problem: Patient reports date of last use use of alcohol on 17, does not display any withdrawal concerns at this time.  Goal: Patient to maintain abstinence throughout outpatient treatment.   Must be reached to complete treatment? Yes  Methods/Strategies (must include amount and frequency):   1. Patient to report any substance/alcohol use to counselor to determine if any changes need to be made to address withdrawal symptoms.   2. Patient to complete any requested UAs.   Target Date: 17  Completion Date:       Dimension 2: Biomedical Conditions/Complications, Risk level: 1    Problem: Patient reports medical concerns currently being addressed by providers.   Goal: Patient to maintain stable health throughout outpatient treatment.   Must be reached to complete treatment? No  Methods/Strategies (must include amount and frequency):   1. Patient to report any changes to physical health to counselor.   2. Patient to attend all scheduled doctors appointments.   3. Patient to take medications as prescribed.   Target Date: 17  Completion Date:       Dimension 3: Emotional/Behavioral/Cognitive, Risk level: 2    Problem: Patient reports some difficulty coping with difficult emotions.   Goal: Patient to build coping skills to address emotions.   Must be reached to complete treatment? No  Methods/Strategies (must include amount and frequency):   1. Patient to begin using coping skills learned in MICD group (such as grounding, breathing, thought challenging, mindfulness, etc), and share in daily check-in any benefits or challenges that you experience using these skills.  Target Date:  11/29/17  Completion Date:     Problem: Patient identifies wanting [facility].   Goal: Patient to pursue intake to program  Must be reached to completed treatment? No  Methods/Strategies (must include amount and frequency):   1. Patient to follow intake process for [facility] program.   2. Patient to communicate with counselor regarding any referral information and sign appropriate releases of information.  Target Date: 11/29/17  Completion Date:         Dimension 4: Readiness to Change, Risk level 1    Problem: Patient identifies that her drinking was causing problems and is seeking services, but also patient appears externally motivated by probation. Patient was previously discharged from outpatient treatment due to lack of attendance.   Goal: Patient to increase motivation towards recovery by participating in MICD outpaitent.   Must be reached to complete treatment? Yes  Methods/Strategies (must include amount and frequency):   1. Patient to attend 2, 3-hour groups per week and all scheduled 1:1s.  2. Patient will contact staff if unable to attend (Viky: [phone], Sabrina: [phone])  Target Date: 11/29/17  Completion Date:       Dimension 5: Relapse/Continued Use/Continued Problem Potential, Risk level: 2    Problem: Patient reports short periods of abstinence over the past year.   Goal: Patient to gain understanding of her triggers and how to use relapse prevention skills.   Must be reached to complete treatment? Yes  Methods/Strategies (must include amount and frequency):   1. Patient to share in daily check-in any urges and addictive thinking to better understand her pattern of use and to prevent relapse in the future.   Target Date: 11/29/17  Completion Date:       Dimension 6: Recovery Environment, Risk level: 2    Problem: Patient reports main support is going to recovery groups  Goal: Patient to continue to build support in the community.  Must be reached to complete  treatment? No  Methods/Strategies (must include amount and frequency):   1. Patient to continue to attend at least 1 meeting per week, share any benefits or challenges experienced in group.   Target Date: 11/29/17  Completion Date:    Problem: Patient reports she is currently on probation  Goal: Patient to meet expectations of probation.   Must be reached to complete treatment? No  Methods/Strategies (must include amount and frequency):   1. Patient to attend all meetings with probation.   2. Patient to have a signed WAYLON for probation.   3. Patient to comply with all probation expectations.   Target Date: 11/29/17  Completion Date:         Resources  Resources to which the patient is being referred for problems when problems are to be addressed concurrently by another provider: Patient reports working on a referral to mental health services, patient had previously been referred to a therapist at [facility].       By signing this document, I am acknowledging that I was actively and directly involved in the development of my treatment plan.           Patient  Signature_________________________________________         Date__________________        Staff Signature  ARMINDA Cloud, [facility]    Date: 11/21/2017  , 8:59 AM
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
## 2021-06-14 NOTE — PROGRESS NOTES
Alicia Chase attended 3 hours of group therapy today. Patient reviewed updated treatment plan and signed with no additional changes.     11/21/2017 1:56 PM Viky Moreira

## 2021-06-15 PROBLEM — K21.9 GERD (GASTROESOPHAGEAL REFLUX DISEASE): Status: ACTIVE | Noted: 2017-02-11

## 2021-06-15 PROBLEM — F32.9 MAJOR DEPRESSIVE DISORDER: Status: ACTIVE | Noted: 2017-02-11

## 2021-06-15 PROBLEM — E03.9 HYPOTHYROID: Status: ACTIVE | Noted: 2017-02-11

## 2021-06-15 PROBLEM — F10.20 ALCOHOL USE DISORDER, SEVERE, DEPENDENCE (H): Status: ACTIVE | Noted: 2017-02-10

## 2021-06-15 PROBLEM — D51.0 PERNICIOUS ANEMIA: Status: ACTIVE | Noted: 2017-02-11

## 2021-06-16 PROBLEM — G89.29 CHRONIC NECK AND BACK PAIN: Status: ACTIVE | Noted: 2017-09-19

## 2021-06-16 PROBLEM — G89.29 CHRONIC RIGHT-SIDED LOW BACK PAIN WITH RIGHT-SIDED SCIATICA: Status: ACTIVE | Noted: 2017-09-19

## 2021-06-16 PROBLEM — B00.9 HSV-1 (HERPES SIMPLEX VIRUS 1) INFECTION: Status: ACTIVE | Noted: 2017-09-19

## 2021-06-16 PROBLEM — M54.41 CHRONIC RIGHT-SIDED LOW BACK PAIN WITH RIGHT-SIDED SCIATICA: Status: ACTIVE | Noted: 2017-09-19

## 2021-06-16 PROBLEM — M54.9 CHRONIC NECK AND BACK PAIN: Status: ACTIVE | Noted: 2017-09-19

## 2021-06-16 PROBLEM — G47.00 INSOMNIA: Status: ACTIVE | Noted: 2017-09-19

## 2021-06-16 PROBLEM — M54.2 CHRONIC NECK AND BACK PAIN: Status: ACTIVE | Noted: 2017-09-19
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
## 2021-06-18 NOTE — LETTER
Letter by Sheryl Watt CNP at      Author: Sheryl Watt CNP Service: -- Author Type: --    Filed:  Encounter Date: 3/12/2019 Status: (Other)         [facility]  03/12/19    Patient: Jayme West  YOB: 1965  Medical Record Number: [id number]  CSN: [id number]                                                                              Non-opioid Controlled Substance Agreement    I understand that my care provider has prescribed a controlled substance to help manage my condition(s). I am taking this medicine to help me function or work. I know this is strong medicine, and that it can cause serious side effects. Controlled substances can be sedating, addicting and may cause a dependency on the drug. They can affect my ability to drive or think, and cause depression. They need to be taken exactly as prescribed. Combining controlled substances with certain medicines or chemicals (such as cocaine, sedatives and tranquilizers, sleeping pills, meth) can be dangerous or even fatal. Also, if I stop controlled substances suddenly, I may have severe withdrawal symptoms.  If not helpful, I may be asked to stop them.    The risks, benefits, and side effects of these medicine(s) were explained to me. I agree that:    1. I will take part in other treatments as advised by my care team. This may be psychiatry or counseling, physical therapy, behavioral therapy, group treatment or a referral to a pain clinic. I will reduce or stop my medicine when my care team tells me to do so.  2. I will take my medicines as prescribed. I will not change the dose or schedule unless my care team tells me to. There will be no refills if I run out early.  I may be contactedwithout warning and asked to complete a urine drug test or pill count at any time.   3. I will keep all my appointments, and understand this is part of the monitoring of controlled substances. My care team may require an office visit  for EVERY controlled substance refill. If I miss appointments or dont follow instructions, my care team may stop my medicine.  4. I will not ask other providers to prescribe controlled substances, and I will not accept controlled substances from other people. If I need another prescribed controlled substance for a new reason, I will tell my care team within 1 business day.  5. I will use one pharmacy to fill all of my controlled substance prescriptions, and it is up to me to make sure that I do not run out of my medicines on weekends or holidays. If my care team is willing to refill my controlled substance prescription without a visit, I must request refills only during office hours, refills may take up to 3 days to process, and it may take up to 5 to 7 days for my medicine to be mailed and ready at my pharmacy. Prescriptions will not be mailed anywhere except my pharmacy.    6. I am responsible for my prescriptions. If the medicine/prescription is lost or stolen, it will not be replaced. I also agree not to share controlled substance medicines with anyone.          [facility]  03/12/19  Patient:  Jayme West  YOB: 1965  Medical Record Number: [id number]  CSN: [id number]    7. I agree to not use ANY illegal or recreational drugs. This includes marijuana, cocaine, bath salts or other drugs. I agree not to use alcohol unless my care team says I may. I agree to give urine samples whenever asked. If I dont give a urine sample, the care team may stop my medicine.    8. If I enroll in the [place] Medical Marijuana program, I will tell my care team. I will also sign an agreement to share my medical records with my care team.    9. I will bring in my list of medicines (or my medicine bottles) each time I come to the clinic.   10. I will tell my care team right away if I become pregnant or have a new medical problem treated outside of my regular clinic.  11. I understand that this  medicine can affect my thinking and judgment. It may be unsafe for me to drive, use machinery and do dangerous tasks. I will not do any of these things until I know how the medicine affects me. If my dose changes, I will wait to see how it affects me. I will contact my care team if I have concerns about medicine side effects.    I understand that if I do not follow any of the conditions above, my prescriptions or treatment may be stopped.      I agree that my provider, clinic care team, and pharmacy may work with any city, state or federal law enforcement agency that investigates the misuse, sale, or other diversion of my controlled medicine. I will allow my provider to discuss my care with or share a copy of this agreement with any other treating provider, pharmacy or emergency room where I receive care. I agree to give up (waive) any right of privacy or confidentiality with respect to these consents.   I have read this agreement and have asked questions about anything I did not understand.    ___________________________________________________________________________  Patient signature - Date/Time  -Jayme West                                      ___________________________________________________________________________  Witness signature                                                                    ___________________________________________________________________________  Provider signature- Sheryl Watt, CNP

## 2021-06-18 NOTE — LETTER
Letter by Sheryl Watt CNP at      Author: Sheryl Watt CNP Service: -- Author Type: --    Filed:  Encounter Date: 3/12/2019 Status: (Other)         [facility]  03/12/19    Patient: Jayme West  YOB: 1965  Medical Record Number: [id number]                                                                  Opioid / Opioid Plus Controlled Substance Agreement    I understand that my care provider has prescribed an opioid (narcotic) controlled substance to help manage my condition(s). I am taking this medicine to help me function or work. I know this is strong medicine, and that it can cause serious side effects. Opioid medicine can be sedating, addicting and may cause a dependency on the drug. They can affect my ability to drive or think, and cause depression. They need to be taken exactly as prescribed. Combining opioids with certain medicines or chemicals (such as cocaine, sedatives and tranquilizers, sleeping pills, meth) can be dangerous or even fatal. Also, if I stop opioids suddenly, I may have severe withdrawal symptoms. Last, I understand that opioids do not work for all types of pain nor for all patients. If not helpful, I may be asked to stop them.        The risks, benefits, and side effects of these medicine(s) were explained to me. I agree that:    1. I will take part in other treatments as advised by my care team. This may be psychiatry or counseling, physical therapy, behavioral therapy, group treatment or a referral to a pain clinic. I will reduce or stop my medicine when my care team tells me to do so.  2. I will take my medicines as prescribed. I will not change the dose or schedule unless my care team tells me to. There will be no refills if I run out early.  I may be contactedwithout warning and asked to complete a urine drug test or pill count at any time.   3. I will keep all my appointments, and understand this is part of the monitoring of  opioids. My care team may require an office visit for EVERY opioid/controlled substance refill. If I miss appointments or dont follow instructions, my care team may stop my medicine.  4. I will not ask other providers to prescribe controlled substances, and I will not accept controlled substances from other people. If I need another prescribed controlled substance for a new reason, I will tell my care team within 1 business day.  5. I will use one pharmacy to fill all of my controlled substance prescriptions, and it is up to me to make sure that I do not run out of my medicines on weekends or holidays. If my care team is willing to refill my opioid prescription without a visit, I must request refills only during office hours, refills may take up to 3 days to process, and it may take up to 5 to 7 days for my medicine to be mailed and ready at my pharmacy. Prescriptions will not be mailed anywhere except my pharmacy.        [id number]  Rev 12/18         Registration to scan to EHR                             Page 1 of 2               Controlled Substance Agreement Opioid        [facility]  03/12/19  Patient: Jayme West  YOB: 1965  Medical Record Number: [id number]                                                                  6. I am responsible for my prescriptions. If the medicine/prescription is lost or stolen, it will not be replaced. I also agree not to share controlled substance medicines with anyone.  7. I agree to not use ANY illegal or recreational drugs. This includes marijuana, cocaine, bath salts or other drugs. I agree not to use alcohol unless my care team says I may.          I agree to give urine samples whenever asked. If I dont give a urine sample, the care team may stop my medicine.    8. If I enroll in the [place] Medical Marijuana program, I will tell my care team. I will also sign an agreement to share my medical records with my care team.   9. I will bring in  my list of medicines (or my medicine bottles) each time I come to the clinic.   10. I will tell my care team right away if I become pregnant or have a new medical problem treated outside of my regular clinic.  11. I understand that this medicine can affect my thinking and judgment. It may be unsafe for me to drive, use machinery and do dangerous tasks. I will not do any of these things until I know how the medicine affects me. If my dose changes, I will wait to see how it affects me. I will contact my care team if I have concerns about medicine side effects.    I understand that if I do not follow any of the conditions above, my prescriptions or treatment may be stopped.      I agree that my provider, clinic care team, and pharmacy may work with any city, state or federal law enforcement agency that investigates the misuse, sale, or other diversion of my controlled medicine. I will allow my provider to discuss my care with or share a copy of this agreement with any other treating provider, pharmacy or emergency room where I receive care. I agree to give up (waive) any right of privacy or confidentiality with respect to these consents.     I have read this agreement and have asked questions about anything I did not understand.      ________________________________________________________________________  Patient signature - Date/Time -  Jayme West                                      ________________________________________________________________________  Witness signature                                                            ________________________________________________________________________  Provider signature - Sheryl Watt, CNP      [id number]  Rev 12/18         Registration to scan to EHR                         Page 2 of 2                   Controlled Substance Agreement Opioid           Page 1 of 2  Opioid Pain Medicines (also known as Narcotics)  What You Need to Know    What are opioids?    Opioids are pain medicines that must be prescribed by a doctor.  They are also known as narcotics.    Examples are:     morphine (MS Contin, Louisa)    oxycodone (Oxycontin)    oxycodone and acetaminophen (Percocet)    hydrocodone and acetaminophen (Vicodin, Norco)     fentanyl patch (Duragesic)     hydromorphone (Dilaudid)     methadone     What do opioids do well?   Opioids are best for short-term pain after a surgery or injury. They also work well for cancer pain. Unlike other pain medicines, they do not cause liver or kidney failure or ulcers. They may help some people with long-lasting (chronic) pain.     What do opioids NOT do well?   Opioids never get rid of pain entirely, and they do not work well for most patients with chronic pain. Opioids do not reduce swelling, one of the causes of pain. They also dont work well for nerve pain.                           For informational purposes only.  Not to replace the advice of your care provider.  Copyright 201 [facility]. All right reserved. Suo Yi 179355-Cvo 02/18.      Page 2 of 2    Risks and side effects   Talk to your doctor before you start or decide to keep taking one of these medicines. Side effects include:    Lowering your breathing rate enough to cause death    Overdose, including death, especially if taking higher than prescribed doses    Long-term opioid use    Worse depression symptoms; less pleasure in things you usually enjoy    Feeling tired or sluggish    Slower thoughts or cloudy thinking    Being more sensitive to pain over time; pain is harder to control    Trouble sleeping or restless sleep    Changes in hormone levels (for example, less testosterone)    Changes in sex drive or ability to have sex    Constipation    Unsafe driving    Itching and sweating    Feeling dizzy    Nausea, vomiting and dry mouth    What else should I know about opioids?  When someone takes opioids for too long or too often, they become dependent.  This means that if you stop or reduce the medicine too quickly, you will have withdrawal symptoms.    Dependence is not the same as addiction. Addiction is when people keep using a substance that harms their body, their mind or their relations with others. If you have a history of drug or alcohol abuse, taking opioids can cause a relapse.    Over time, opioids dont work as well. Most people will need higher and higher doses. The higher the dose, the more serious the side effects. We dont know the long-term effects of opioids.      Prescribed opioids aren't the best way to manage chronic pain    Other ways to manage pain include:      Ibuprofen or acetaminophen.  You should always try this first.      Treat health problems that may be causing pain.      acupuncture or massage, deep breathing, meditation, visual imagery, aromatherapy.      Use heat or ice at the pain site      Physical therapy and exercise      Stop smoking      See a counselor or therapist                                                  People who have used opioids for a long time may have a lower quality of life, worse depression, higher levels of pain and more visits to doctors.    Never share your opioids with others. Be sure to store opioids in a secure place, locked if possible.Young children can easily swallow them and overdose.     You can overdose on opioids.  Signs of overdose include decrease or loss of consciousness, slowed breathing, trouble waking and blue lips.  If someone is worried about overdose, they should call 911.    If you are at risk for overdose, you may get naloxone (Narcan, a medicine that reverses the effects of opioids.  If you overdose, a friend or family member can give you Narcan while waiting for the ambulance.  They need to know the signs of overdose and how to give Narcan.    While you're taking opioids:    Don't use alcohol or street drugs. Taking them together can cause death.    Don't take any of these medicines  unless your doctor says its okay.  Taking these with opioids can cause death.    Benzodiazepines (such as lorazepam         or diazepam)    Muscle relaxers (such as cyclobenzaprine)    sleeping pills    other opioids    Safe disposal of opioids  Find your area drug take-back program, your pharmacy mail-back program, buy a special disposal bag (such as Deterra) from your pharmacy or flush them down the toilet.  Use the guidelines at:  www.fda.gov/drugs/resourcesforyou

## 2021-06-20 NOTE — PROGRESS NOTES
"1. Insomnia, unspecified type     2. Pernicious anemia  syringe with needle 3 mL 22 gauge x 1\" Syrg      Medications Ordered   Medications     syringe with needle 3 mL 22 gauge x 1\" Syrg     Sig: Use 3cc syringe with needle for injecting B12 1000mcq into muscle once every 28 days.     Dispense:  3 Syringe     Refill:  3     zolpidem (AMBIEN CR) 12.5 MG CR tablet     Sig: Take 1 tablet (12.5 mg total) by mouth at bedtime as needed for sleep.     Dispense:  30 tablet     Refill:  2        Plan: I did refill her zolpidem controlled release at 12.5 mg a day.  I gave her 30 with 2 refills.  I told her to make a follow-up appointment with Sheryl when she gets back from maternity leave.  She is also requesting refill of her syringes that she is B12 injections and I did that for her as well.    15 minutes spent together with the patient today, more than 50% spent in counseling, discussing the above topics.     Subjective: 52-year-old female who is here today for follow-up of her insomnia.  She has been on zolpidem at 12.5 mg controlled release at night.  She does not take them every night, but the last time she was in the try to get her down to 5 mg at night, and it really is not working well for her at all.  It does not get her to sleep and also does not keep her asleep, and she would like to go back to taking the 12.5 mg tablets.  She does not use them every night, but probably uses them about 4 times a week.    Objective: deferred today    "

## 2021-06-23 NOTE — TELEPHONE ENCOUNTER
Talked with patient I let her know a one time refill was sent to the pharmacy but any additional refills she will need to make a med rechk appointment.   no ROM deficits were identified

## 2021-06-23 NOTE — TELEPHONE ENCOUNTER
Patient Returning Call  Reason for call:  Patient called back.  Information relayed to patient:  Informed that the provider has not reviewed this message as of yet.  Patient states she is out of this medication and would like a covering provider to fill today.  Patient has additional questions:  Yes  If YES, what are your questions/concerns:  As above.  Okay to leave a detailed message?: Yes
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
## 2021-07-03 NOTE — ADDENDUM NOTE
Addendum Note by Milka Holt MD at 3/15/2017  8:45 AM     Author: Milka Holt MD Service: -- Author Type: Physician    Filed: 3/15/2017  8:45 AM Encounter Date: 3/7/2017 Status: Signed    : Milka Holt MD (Physician)    Addended by: MILKA HOLT on: 3/15/2017 08:45 AM        Modules accepted: Orders
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
## 2023-01-27 NOTE — PROGRESS NOTES
[facility] Updated [facility] Health Assessment    Date: 2017   : Crystal Osorio      Name: Alicia Chase        Address: [address] [facility] [address]  : 1965  Age: 51 y.o.   Race: White or    Marital Status:single  Phone: [phone] (home)   SS#: [id number]  Referral Source: Self.       Recommendations: Patient continues to meet criteria for Alcohol Use Disorder-Severe.  Complete OP MICD program.  Service Requested: MICD OP  Employment: Part time.  Health Insurance: Medicare/BC      Dimension I Acute intoxication/Withdrawal potential    Symptomology (past 12 months):   Diarrhea, shakiness    Observed or reported (withdrawal symptoms): None observed or reported at this time.    Use since last assessment in 2017:  Drug Last Use Amount Frequency Route   Alcohol 2017 1 glass wine Prior to tx -daily drinking.  Then,until May, only occasional oral   Marijuana       Cocaine/Crack       Opiates/Herion       Hallucinogens       Sedatives/Benzos       Amphetamine/Meth       Inhalants       Other         Dimension I Risk Rating :  0    Reason Risk Rating Assigned: Client reports no current WD symptoms, none observed, with last date of use 2017 (alcohol).      Dimension II Biomedical Conditions    Any known health conditions: Yes  Is use related to health problem/concern: Yes (depression)  [facility]  MD documents physical deterioration due to use: No  List Health Concerns/Conditions: depression, hypothyroidism, heartburn, insomnia - meds for all, including Remron & Seroquel for sleep    Dimension II Risk Rating :  1    Reason Risk Rating Assigned: Patient reports chronic health conditions, that she is taking meds as prescribed and believes they are beneficial.  Patient able to access care as needed.                  Dimension III Emotional/Behavioral/Cognitive    Oriented to: person, place, time and situation  Current Mental Health Services: No,  l  Hospitalization for MH or psychiatric problems: Yes  How many Hospitalizations: 2  Last Hospitalization; date and location: 10/14/2016, [place] Diagnoses: Major depression  [facility]      [facility]  Current Medications: exapro, seroquel, remron [facility]   Current Outpatient Prescriptions:      escitalopram oxalate (LEXAPRO) 20 MG tablet, Take 20 mg by mouth daily., Disp: , Rfl:      ibuprofen (ADVIL,MOTRIN) 200 MG tablet, Take 200 mg by mouth every 6 (six) hours as needed for pain., Disp: , Rfl:      levothyroxine (SYNTHROID, LEVOTHROID) 50 MCG tablet, Take 50 mcg by mouth Daily at 6:00 am., Disp: , Rfl:      levothyroxine (SYNTHROID, LEVOTHROID) 50 MCG tablet, Take 1 tablet (50 mcg total) by mouth Daily at 6:00 am., Disp: 30 tablet, Rfl: 0     melatonin 3 mg Tab tablet, Take 2 tablets (6 mg total) by mouth bedtime as needed (insomnia)., Disp: 30 tablet, Rfl: 0     omeprazole (PRILOSEC) 20 MG capsule, Take 20 mg by mouth Daily before breakfast., Disp: , Rfl:      omeprazole (PRILOSEC) 20 MG capsule, Take 1 capsule (20 mg total) by mouth Daily before breakfast., Disp: 30 capsule, Rfl: 0     prazosin (MINIPRESS) 2 MG capsule, Take 1 capsule (2 mg total) by mouth bedtime., Disp: 30 capsule, Rfl: 0     SUMAtriptan (IMITREX) 100 MG tablet, Take 100 mg by mouth every 2 (two) hours as needed for migraine., Disp: , Rfl:   Taking medications as prescribed: Yes     Medications Helpful: Yes  Current Suicide ideation: No  If yes, any plan? What is plan?: NA  Previous Suicide Attempts? (explain): Yes, 10/14/2016  Made Referral to  Center: No    Dimension III Risk Rating :  2    Reason Risk Rating Assigned: Patient continues w/dx of major depression.  Denies SI/HI at this time.  Patient reports taking meds as prescribed, no MH therapy at this time.   Patient scored Low Risk on PANSI screen                  Dimension IV Readiness to Change    Mandated, or coerced into assessment or treatment:  Yes,  mandated, but I think it's a good thing  Does client feel there is a problem:  Yes  Verbalization of need/desire to change: Yes  Impression of :   Cooperative, expresses willingness to complete OP tx (including RP) and concern about conflicts with work schedule.    Dimension IV Risk Rating :  0    Reason Risk Rating Assigned:   Patient states she is motivated to complete OP treatment, including the Relapse Prevention program, because of probation mandates but also because she believes it is beneficial and increases her chances of remaining sober long-term.                  Dimension V Relapse/Continued Use/Continued Problem Potential    Lifetime # of CD Treatments: 3  List program, dates, and status of completion[facility] [place], [facility]-d/c [facility] recently    Dimension V Risk Ratin    Reason Risk Rating Assigned:   Relapse potential appears to be moderate at this time and patient appears to recognize the benefits of returning to treatment to continue work on relapse coping strategies and her individual RP plan.                  Dimension VI Recovery Environment    Sober Family support:  Yes, daughter, son, mom, sister - family doesn't drink  Sober friend support: Yes  Connected to sober support network: Yes - try to hit 3 meetings a week, can't currently ([facility] for 20 days).  Current living circumstances: self, apartment  Environment supportive of recovery: yes, employer works with me to arrange time needed  Spiritual/Congregational needs: No   Cultural needs: No   Family history of CD/MH issues: No  Family size: 1          Number of children: 2, daugher age 30, son age 25  Current Legal Concerns: Yes explain: Probation-[facility] - [facility] until 2017 (20 days)  Pregnancy Concerns: No  Mothers First Contacted: N/A  Child Protection Involvement: N/A  CP worker Name: GREGORY       Phone:     Dimension VI Risk Rating :  2    Reason Risk Rating Assigned: Patient reports she is currently serving  20 days of electronic home monitoring (able to work and attend treatment) that ends 8/12.  [facility], Pino Vargas is supervising her EHM.  (Info from PO: Patient is currently complying w/all probation mandates.  Patient states she typically attend AA 3X weekly.  Recently began working at [employer], her supervisor is willing to work w/her schedule-wise. Patient reports 2 adult children, and that her children, mom and sister are supportive and that family members don't drink.  Did not identify fun, sober leisure activities.      GONZALO Ramsey  7/26/2017  10:50 AM     0

## 2023-02-24 NOTE — PROGRESS NOTES
Alicia Chase attended 3 hours of group therapy today.    9/5/2017 2:33 PM Viky Moreira   Private Residence